# Patient Record
Sex: MALE | Race: WHITE | NOT HISPANIC OR LATINO
[De-identification: names, ages, dates, MRNs, and addresses within clinical notes are randomized per-mention and may not be internally consistent; named-entity substitution may affect disease eponyms.]

---

## 2017-01-12 LAB — PSA SERPL-MCNC: 0.7

## 2017-01-30 ENCOUNTER — APPOINTMENT (OUTPATIENT)
Dept: UROLOGY | Facility: CLINIC | Age: 72
End: 2017-01-30

## 2017-04-24 LAB — PSA SERPL-MCNC: NORMAL

## 2017-05-05 ENCOUNTER — APPOINTMENT (OUTPATIENT)
Dept: UROLOGY | Facility: CLINIC | Age: 72
End: 2017-05-05

## 2017-05-26 ENCOUNTER — APPOINTMENT (OUTPATIENT)
Dept: UROLOGY | Facility: CLINIC | Age: 72
End: 2017-05-26

## 2017-05-29 LAB
BILIRUB UR QL STRIP: NEGATIVE
GLUCOSE UR-MCNC: NEGATIVE
HCG UR QL: 0.2 EU/DL
HGB UR QL STRIP.AUTO: NORMAL
KETONES UR-MCNC: NEGATIVE
LEUKOCYTE ESTERASE UR QL STRIP: NEGATIVE
NITRITE UR QL STRIP: NEGATIVE
PH UR STRIP: 5
PROT UR STRIP-MCNC: NEGATIVE
SP GR UR STRIP: 1.02

## 2017-09-29 ENCOUNTER — RX RENEWAL (OUTPATIENT)
Age: 72
End: 2017-09-29

## 2017-09-29 ENCOUNTER — MEDICATION RENEWAL (OUTPATIENT)
Age: 72
End: 2017-09-29

## 2018-04-04 ENCOUNTER — OUTPATIENT (OUTPATIENT)
Dept: OUTPATIENT SERVICES | Facility: HOSPITAL | Age: 73
LOS: 1 days | Discharge: HOME | End: 2018-04-04

## 2018-04-04 ENCOUNTER — TRANSCRIPTION ENCOUNTER (OUTPATIENT)
Age: 73
End: 2018-04-04

## 2018-04-04 DIAGNOSIS — Z98.89 OTHER SPECIFIED POSTPROCEDURAL STATES: Chronic | ICD-10-CM

## 2018-04-04 DIAGNOSIS — K40.90 UNILATERAL INGUINAL HERNIA, WITHOUT OBSTRUCTION OR GANGRENE, NOT SPECIFIED AS RECURRENT: Chronic | ICD-10-CM

## 2018-04-04 DIAGNOSIS — H33.20 SEROUS RETINAL DETACHMENT, UNSPECIFIED EYE: Chronic | ICD-10-CM

## 2018-04-04 DIAGNOSIS — Z98.890 OTHER SPECIFIED POSTPROCEDURAL STATES: Chronic | ICD-10-CM

## 2018-04-04 DIAGNOSIS — K43.0 INCISIONAL HERNIA WITH OBSTRUCTION, WITHOUT GANGRENE: ICD-10-CM

## 2018-04-04 DIAGNOSIS — Z90.89 ACQUIRED ABSENCE OF OTHER ORGANS: Chronic | ICD-10-CM

## 2018-04-04 LAB
ANION GAP SERPL CALC-SCNC: 4 MMOL/L — LOW (ref 5–17)
BASOPHILS # BLD AUTO: 0.04 K/UL — SIGNIFICANT CHANGE UP (ref 0–0.2)
BASOPHILS NFR BLD AUTO: 0.6 % — SIGNIFICANT CHANGE UP (ref 0–2)
BUN SERPL-MCNC: 18 MG/DL — SIGNIFICANT CHANGE UP (ref 7–23)
CALCIUM SERPL-MCNC: 9.4 MG/DL — SIGNIFICANT CHANGE UP (ref 8.5–10.1)
CHLORIDE SERPL-SCNC: 105 MMOL/L — SIGNIFICANT CHANGE UP (ref 96–108)
CO2 SERPL-SCNC: 30 MMOL/L — SIGNIFICANT CHANGE UP (ref 22–31)
CREAT SERPL-MCNC: 0.97 MG/DL — SIGNIFICANT CHANGE UP (ref 0.5–1.3)
EOSINOPHIL # BLD AUTO: 0.15 K/UL — SIGNIFICANT CHANGE UP (ref 0–0.5)
EOSINOPHIL NFR BLD AUTO: 2.1 % — SIGNIFICANT CHANGE UP (ref 0–6)
GLUCOSE SERPL-MCNC: 90 MG/DL — SIGNIFICANT CHANGE UP (ref 70–99)
HCT VFR BLD CALC: 46.6 % — SIGNIFICANT CHANGE UP (ref 39–50)
HGB BLD-MCNC: 15.6 G/DL — SIGNIFICANT CHANGE UP (ref 13–17)
IMM GRANULOCYTES NFR BLD AUTO: 0.4 % — SIGNIFICANT CHANGE UP (ref 0–1.5)
LYMPHOCYTES # BLD AUTO: 1.86 K/UL — SIGNIFICANT CHANGE UP (ref 1–3.3)
LYMPHOCYTES # BLD AUTO: 26.2 % — SIGNIFICANT CHANGE UP (ref 13–44)
MCHC RBC-ENTMCNC: 29.3 PG — SIGNIFICANT CHANGE UP (ref 27–34)
MCHC RBC-ENTMCNC: 33.5 GM/DL — SIGNIFICANT CHANGE UP (ref 32–36)
MCV RBC AUTO: 87.4 FL — SIGNIFICANT CHANGE UP (ref 80–100)
MONOCYTES # BLD AUTO: 0.77 K/UL — SIGNIFICANT CHANGE UP (ref 0–0.9)
MONOCYTES NFR BLD AUTO: 10.9 % — SIGNIFICANT CHANGE UP (ref 2–14)
NEUTROPHILS # BLD AUTO: 4.24 K/UL — SIGNIFICANT CHANGE UP (ref 1.8–7.4)
NEUTROPHILS NFR BLD AUTO: 59.8 % — SIGNIFICANT CHANGE UP (ref 43–77)
PLATELET # BLD AUTO: 227 K/UL — SIGNIFICANT CHANGE UP (ref 150–400)
POTASSIUM SERPL-MCNC: 4.5 MMOL/L — SIGNIFICANT CHANGE UP (ref 3.5–5.3)
POTASSIUM SERPL-SCNC: 4.5 MMOL/L — SIGNIFICANT CHANGE UP (ref 3.5–5.3)
RBC # BLD: 5.33 M/UL — SIGNIFICANT CHANGE UP (ref 4.2–5.8)
RBC # FLD: 12.8 % — SIGNIFICANT CHANGE UP (ref 10.3–14.5)
SODIUM SERPL-SCNC: 139 MMOL/L — SIGNIFICANT CHANGE UP (ref 135–145)
WBC # BLD: 7.09 K/UL — SIGNIFICANT CHANGE UP (ref 3.8–10.5)
WBC # FLD AUTO: 7.09 K/UL — SIGNIFICANT CHANGE UP (ref 3.8–10.5)

## 2018-04-04 NOTE — ASU PATIENT PROFILE, ADULT - PSH
Detached retina  Repair of retina, extraction of cataract with lens implant  2006  H/O cystoscopy  multiple with bladder biopsy  History of bladder surgery  resection of bladder tumor 2006  History of right inguinal hernia repair  1970s  Right inguinal hernia  40 years ago  S/P colonoscopy  multiple  S/P tonsillectomy  as a child

## 2018-04-04 NOTE — CHART NOTE - NSCHARTNOTEFT_GEN_A_CORE
BP left arm sitting 169/92  repeat 20 minutes later manual right arm sitting 138/94  HR 73 bpm  Dora 97.7 F  RR 14/min  O2 sat 98% on RA      1. Labs as per surgeon  2. EKG on chart  3. Medical clearance with PCP Dr Roca  4. discussed EZ sponges, mupirocin & day of procedure instructions

## 2018-04-04 NOTE — ASU PATIENT PROFILE, ADULT - PMH
Abdominal pain    Bladder cancer  h/o  BPH (benign prostatic hypertrophy)    Hyperlipidemia    Hypertension    Hypothyroid    Right retinal detachment    Ventral hernia

## 2018-04-06 RX ORDER — ACETAMINOPHEN 500 MG
975 TABLET ORAL ONCE
Qty: 0 | Refills: 0 | Status: COMPLETED | OUTPATIENT
Start: 2018-04-09 | End: 2018-04-09

## 2018-04-06 RX ORDER — FAMOTIDINE 10 MG/ML
20 INJECTION INTRAVENOUS ONCE
Qty: 0 | Refills: 0 | Status: COMPLETED | OUTPATIENT
Start: 2018-04-09 | End: 2018-04-09

## 2018-04-09 ENCOUNTER — OUTPATIENT (OUTPATIENT)
Dept: OUTPATIENT SERVICES | Facility: HOSPITAL | Age: 73
LOS: 1 days | Discharge: HOME | End: 2018-04-09

## 2018-04-09 VITALS
HEIGHT: 70 IN | OXYGEN SATURATION: 100 % | HEART RATE: 72 BPM | TEMPERATURE: 97 F | RESPIRATION RATE: 14 BRPM | SYSTOLIC BLOOD PRESSURE: 137 MMHG | DIASTOLIC BLOOD PRESSURE: 81 MMHG | WEIGHT: 212.08 LBS

## 2018-04-09 VITALS
TEMPERATURE: 97 F | RESPIRATION RATE: 16 BRPM | OXYGEN SATURATION: 98 % | DIASTOLIC BLOOD PRESSURE: 80 MMHG | HEART RATE: 66 BPM | SYSTOLIC BLOOD PRESSURE: 144 MMHG

## 2018-04-09 DIAGNOSIS — Z98.890 OTHER SPECIFIED POSTPROCEDURAL STATES: Chronic | ICD-10-CM

## 2018-04-09 DIAGNOSIS — H33.20 SEROUS RETINAL DETACHMENT, UNSPECIFIED EYE: Chronic | ICD-10-CM

## 2018-04-09 DIAGNOSIS — Z90.89 ACQUIRED ABSENCE OF OTHER ORGANS: Chronic | ICD-10-CM

## 2018-04-09 DIAGNOSIS — Z98.89 OTHER SPECIFIED POSTPROCEDURAL STATES: Chronic | ICD-10-CM

## 2018-04-09 DIAGNOSIS — K40.90 UNILATERAL INGUINAL HERNIA, WITHOUT OBSTRUCTION OR GANGRENE, NOT SPECIFIED AS RECURRENT: Chronic | ICD-10-CM

## 2018-04-09 RX ORDER — ASPIRIN/CALCIUM CARB/MAGNESIUM 324 MG
81 TABLET ORAL DAILY
Qty: 0 | Refills: 0 | Status: DISCONTINUED | OUTPATIENT
Start: 2018-04-09 | End: 2018-04-24

## 2018-04-09 RX ORDER — SODIUM CHLORIDE 9 MG/ML
3 INJECTION INTRAMUSCULAR; INTRAVENOUS; SUBCUTANEOUS EVERY 8 HOURS
Qty: 0 | Refills: 0 | Status: DISCONTINUED | OUTPATIENT
Start: 2018-04-09 | End: 2018-04-09

## 2018-04-09 RX ORDER — SODIUM CHLORIDE 9 MG/ML
1000 INJECTION, SOLUTION INTRAVENOUS
Qty: 0 | Refills: 0 | Status: DISCONTINUED | OUTPATIENT
Start: 2018-04-09 | End: 2018-04-24

## 2018-04-09 RX ORDER — ONDANSETRON 8 MG/1
4 TABLET, FILM COATED ORAL ONCE
Qty: 0 | Refills: 0 | Status: DISCONTINUED | OUTPATIENT
Start: 2018-04-09 | End: 2018-04-09

## 2018-04-09 RX ORDER — OXYCODONE HYDROCHLORIDE 5 MG/1
1 TABLET ORAL
Qty: 20 | Refills: 0
Start: 2018-04-09

## 2018-04-09 RX ORDER — LISINOPRIL 2.5 MG/1
20 TABLET ORAL EVERY 12 HOURS
Qty: 0 | Refills: 0 | Status: DISCONTINUED | OUTPATIENT
Start: 2018-04-09 | End: 2018-04-24

## 2018-04-09 RX ORDER — OXYCODONE HYDROCHLORIDE 5 MG/1
5 TABLET ORAL EVERY 4 HOURS
Qty: 0 | Refills: 0 | Status: DISCONTINUED | OUTPATIENT
Start: 2018-04-09 | End: 2018-04-09

## 2018-04-09 RX ORDER — ATORVASTATIN CALCIUM 80 MG/1
20 TABLET, FILM COATED ORAL AT BEDTIME
Qty: 0 | Refills: 0 | Status: DISCONTINUED | OUTPATIENT
Start: 2018-04-09 | End: 2018-04-24

## 2018-04-09 RX ORDER — LEVOTHYROXINE SODIUM 125 MCG
200 TABLET ORAL DAILY
Qty: 0 | Refills: 0 | Status: DISCONTINUED | OUTPATIENT
Start: 2018-04-09 | End: 2018-04-24

## 2018-04-09 RX ORDER — AMLODIPINE BESYLATE 2.5 MG/1
5 TABLET ORAL DAILY
Qty: 0 | Refills: 0 | Status: DISCONTINUED | OUTPATIENT
Start: 2018-04-09 | End: 2018-04-24

## 2018-04-09 RX ORDER — SODIUM CHLORIDE 9 MG/ML
1000 INJECTION, SOLUTION INTRAVENOUS
Qty: 0 | Refills: 0 | Status: DISCONTINUED | OUTPATIENT
Start: 2018-04-09 | End: 2018-04-09

## 2018-04-09 RX ORDER — ONDANSETRON 8 MG/1
4 TABLET, FILM COATED ORAL EVERY 6 HOURS
Qty: 0 | Refills: 0 | Status: DISCONTINUED | OUTPATIENT
Start: 2018-04-09 | End: 2018-04-24

## 2018-04-09 RX ORDER — FENTANYL CITRATE 50 UG/ML
50 INJECTION INTRAVENOUS
Qty: 0 | Refills: 0 | Status: DISCONTINUED | OUTPATIENT
Start: 2018-04-09 | End: 2018-04-09

## 2018-04-09 RX ORDER — FINASTERIDE 5 MG/1
5 TABLET, FILM COATED ORAL DAILY
Qty: 0 | Refills: 0 | Status: DISCONTINUED | OUTPATIENT
Start: 2018-04-09 | End: 2018-04-24

## 2018-04-09 RX ADMIN — Medication 975 MILLIGRAM(S): at 11:57

## 2018-04-09 RX ADMIN — SODIUM CHLORIDE 100 MILLILITER(S): 9 INJECTION, SOLUTION INTRAVENOUS at 13:41

## 2018-04-09 RX ADMIN — FAMOTIDINE 20 MILLIGRAM(S): 10 INJECTION INTRAVENOUS at 11:57

## 2018-04-09 NOTE — ASU DISCHARGE PLAN (ADULT/PEDIATRIC). - NURSING INSTRUCTIONS
Begin with liquids and light food ( tea, toast, Jello, soups). Advance to what you normally eat. Liquids should taken in adequate amounts today.Refer to multi color post op discharge instruction sheet     CALL the DOCTOR:    -Fever greater than  101F  - Signs  of infection such as : increase pain,swelling,redness,or a bad  smell coming from the wound.  -Excessive amount of bleeding.  - Any pain that appears to be getting worse.  - Vomiting  -  If you have  not urinated 8 hours after surgery or have any difficulty urinating.     A responsible adult should be with you for the rest of the day and night for your safety and to help you if you needed.    Review attached FACT SHEET if applicable. For any problems or concerns,contact your doctor. [facility] patients should call the [facility]. If you cannot reach the doctor or clinic, call [facility] Emergency Department at [phone] or go to your local Emergency Department.  A responsible adult should be with you for the rest of the day and night for your safety and to help you if you needed. Resume your medications as listed on the attached Medication Record.

## 2018-04-09 NOTE — BRIEF OPERATIVE NOTE - PROCEDURE
<<-----Click on this checkbox to enter Procedure Umbilical hernia repair  04/09/2018  open  Active  BSLOMA

## 2018-04-11 DIAGNOSIS — E78.00 PURE HYPERCHOLESTEROLEMIA, UNSPECIFIED: ICD-10-CM

## 2018-04-11 DIAGNOSIS — K42.9 UMBILICAL HERNIA WITHOUT OBSTRUCTION OR GANGRENE: ICD-10-CM

## 2018-04-11 DIAGNOSIS — E03.9 HYPOTHYROIDISM, UNSPECIFIED: ICD-10-CM

## 2018-04-11 DIAGNOSIS — K43.9 VENTRAL HERNIA WITHOUT OBSTRUCTION OR GANGRENE: ICD-10-CM

## 2018-09-14 ENCOUNTER — MEDICATION RENEWAL (OUTPATIENT)
Age: 73
End: 2018-09-14

## 2018-10-09 PROBLEM — K43.9 VENTRAL HERNIA WITHOUT OBSTRUCTION OR GANGRENE: Chronic | Status: ACTIVE | Noted: 2018-04-04

## 2018-11-05 ENCOUNTER — APPOINTMENT (OUTPATIENT)
Dept: UROLOGY | Facility: CLINIC | Age: 73
End: 2018-11-05

## 2018-11-14 ENCOUNTER — APPOINTMENT (OUTPATIENT)
Dept: UROLOGY | Facility: CLINIC | Age: 73
End: 2018-11-14
Payer: MEDICARE

## 2018-11-14 ENCOUNTER — APPOINTMENT (OUTPATIENT)
Dept: UROLOGY | Facility: CLINIC | Age: 73
End: 2018-11-14

## 2018-11-14 VITALS
DIASTOLIC BLOOD PRESSURE: 85 MMHG | SYSTOLIC BLOOD PRESSURE: 130 MMHG | WEIGHT: 205 LBS | BODY MASS INDEX: 27.8 KG/M2 | HEART RATE: 100 BPM

## 2018-11-14 PROCEDURE — 99213 OFFICE O/P EST LOW 20 MIN: CPT | Mod: 25

## 2018-11-14 PROCEDURE — 52000 CYSTOURETHROSCOPY: CPT

## 2019-06-04 ENCOUNTER — RX RENEWAL (OUTPATIENT)
Age: 74
End: 2019-06-04

## 2019-06-05 ENCOUNTER — APPOINTMENT (OUTPATIENT)
Dept: OTOLARYNGOLOGY | Facility: CLINIC | Age: 74
End: 2019-06-05
Payer: MEDICARE

## 2019-06-05 VITALS
WEIGHT: 211 LBS | BODY MASS INDEX: 28.58 KG/M2 | HEIGHT: 72 IN | SYSTOLIC BLOOD PRESSURE: 147 MMHG | DIASTOLIC BLOOD PRESSURE: 80 MMHG | HEART RATE: 64 BPM

## 2019-06-05 DIAGNOSIS — Z86.39 PERSONAL HISTORY OF OTHER ENDOCRINE, NUTRITIONAL AND METABOLIC DISEASE: ICD-10-CM

## 2019-06-05 DIAGNOSIS — Z86.79 PERSONAL HISTORY OF OTHER DISEASES OF THE CIRCULATORY SYSTEM: ICD-10-CM

## 2019-06-05 DIAGNOSIS — R09.82 POSTNASAL DRIP: ICD-10-CM

## 2019-06-05 DIAGNOSIS — J06.9 ACUTE UPPER RESPIRATORY INFECTION, UNSPECIFIED: ICD-10-CM

## 2019-06-05 DIAGNOSIS — Z83.2 FAMILY HISTORY OF DISEASES OF THE BLOOD AND BLOOD-FORMING ORGANS AND CERTAIN DISORDERS INVOLVING THE IMMUNE MECHANISM: ICD-10-CM

## 2019-06-05 DIAGNOSIS — Z80.9 FAMILY HISTORY OF MALIGNANT NEOPLASM, UNSPECIFIED: ICD-10-CM

## 2019-06-05 PROCEDURE — 30903 CONTROL OF NOSEBLEED: CPT | Mod: LT

## 2019-06-05 PROCEDURE — 99213 OFFICE O/P EST LOW 20 MIN: CPT | Mod: 25

## 2019-06-05 RX ORDER — ATORVASTATIN CALCIUM 20 MG/1
20 TABLET, FILM COATED ORAL
Qty: 90 | Refills: 0 | Status: ACTIVE | COMMUNITY
Start: 2019-03-01

## 2019-06-05 NOTE — PROCEDURE
[FreeTextEntry3] : The left nasal passage was cleared. A bleeding site is noted along the\par anterior/mid septum\par \par An active bleeding vessel was located.\par Topical Xylocaine and Marvin-Synephrine was then applied on a cotton ball.\par Cauterization was then performed with silver nitrate. Pressure was applied with a cotton ball and it  was left in place for 10 minutes.\par There was cessation of bleeding.\par  [FreeTextEntry2] : left epistaxis

## 2019-06-05 NOTE — HISTORY OF PRESENT ILLNESS
[de-identified] : recurring left nose bleeds past 3 days\par uri chest cold 10 d ago rx by primary cefdinir and prednisone\par sp nasal septoplasty 14 y ago\par daily asa 81

## 2019-06-05 NOTE — REASON FOR VISIT
[Subsequent Evaluation] : a subsequent evaluation for [Epistaxis] : epistaxis [FreeTextEntry2] : chest congestion

## 2019-06-05 NOTE — REVIEW OF SYSTEMS
[Nose Bleeds] : nose bleeds [Snoring With Pauses] : snoring with pauses [Problem Snoring] : problem snoring [Throat Pain] : throat pain [Shortness Of Breath] : shortness of breath [Cough] : cough [Wheezing] : wheezing [Negative] : Heme/Lymph

## 2019-07-05 ENCOUNTER — RX RENEWAL (OUTPATIENT)
Age: 74
End: 2019-07-05

## 2019-08-20 NOTE — ASU PREOP CHECKLIST - NS PREOP CHK CHLOROHEX WASH
at home:
Alert-The patient is alert, awake and responds to voice. The patient is oriented to time, place, and person. The triage nurse is able to obtain subjective information.

## 2019-11-21 ENCOUNTER — RX RENEWAL (OUTPATIENT)
Age: 74
End: 2019-11-21

## 2019-12-10 ENCOUNTER — APPOINTMENT (OUTPATIENT)
Dept: UROLOGY | Facility: CLINIC | Age: 74
End: 2019-12-10
Payer: MEDICARE

## 2019-12-10 VITALS
HEIGHT: 72 IN | WEIGHT: 210 LBS | OXYGEN SATURATION: 97 % | DIASTOLIC BLOOD PRESSURE: 83 MMHG | BODY MASS INDEX: 28.44 KG/M2 | SYSTOLIC BLOOD PRESSURE: 147 MMHG | TEMPERATURE: 98 F | HEART RATE: 74 BPM

## 2019-12-10 DIAGNOSIS — R35.0 FREQUENCY OF MICTURITION: ICD-10-CM

## 2019-12-10 PROCEDURE — 99213 OFFICE O/P EST LOW 20 MIN: CPT | Mod: 25

## 2019-12-10 PROCEDURE — 52000 CYSTOURETHROSCOPY: CPT

## 2019-12-10 RX ORDER — TERAZOSIN 5 MG/1
5 CAPSULE ORAL
Qty: 7 | Refills: 0 | Status: COMPLETED | COMMUNITY
Start: 2017-05-05 | End: 2019-12-10

## 2019-12-10 RX ORDER — CIPROFLOXACIN HYDROCHLORIDE 500 MG/1
500 TABLET, FILM COATED ORAL
Qty: 28 | Refills: 0 | Status: COMPLETED | COMMUNITY
Start: 2017-05-05 | End: 2019-12-10

## 2019-12-10 RX ORDER — CIPROFLOXACIN AND DEXAMETHASONE 3; 1 MG/ML; MG/ML
0.3-0.1 SUSPENSION/ DROPS AURICULAR (OTIC) TWICE DAILY
Qty: 1 | Refills: 1 | Status: COMPLETED | COMMUNITY
Start: 2019-06-04 | End: 2019-12-10

## 2019-12-10 RX ORDER — TAMSULOSIN HYDROCHLORIDE 0.4 MG/1
0.4 CAPSULE ORAL
Qty: 7 | Refills: 0 | Status: COMPLETED | OUTPATIENT
Start: 2017-05-05 | End: 2019-12-10

## 2019-12-10 NOTE — PHYSICAL EXAM
[General Appearance - Well Nourished] : well nourished [General Appearance - Well Developed] : well developed [Normal Appearance] : normal appearance [Well Groomed] : well groomed [General Appearance - In No Acute Distress] : no acute distress [Abdomen Soft] : soft [Abdomen Tenderness] : non-tender [Costovertebral Angle Tenderness] : no ~M costovertebral angle tenderness [Urinary Bladder Findings] : the bladder was normal on palpation [Testes Mass (___cm)] : there were no testicular masses [Scrotum] : the scrotum was normal [Urethral Meatus] : meatus normal [No Prostate Nodules] : no prostate nodules [Edema] : no peripheral edema [] : no respiratory distress [Exaggerated Use Of Accessory Muscles For Inspiration] : no accessory muscle use [Respiration, Rhythm And Depth] : normal respiratory rhythm and effort [Mood] : the mood was normal [Affect] : the affect was normal [Oriented To Time, Place, And Person] : oriented to person, place, and time [Not Anxious] : not anxious [Normal Station and Gait] : the gait and station were normal for the patient's age [No Focal Deficits] : no focal deficits [No Palpable Adenopathy] : no palpable adenopathy

## 2019-12-10 NOTE — ASSESSMENT
[FreeTextEntry1] : Patient stable from a urinary standpoint. Cystoscopy was negative. Labs are ordered and he will follow up in one year

## 2019-12-10 NOTE — HISTORY OF PRESENT ILLNESS
[FreeTextEntry1] : This patient presents for his followup. He has been urinating without difficulty. He also need cystoscopy for his history of bladder cancer in the past.

## 2020-01-27 ENCOUNTER — OUTPATIENT (OUTPATIENT)
Dept: OUTPATIENT SERVICES | Facility: HOSPITAL | Age: 75
LOS: 1 days | End: 2020-01-27
Payer: MEDICARE

## 2020-01-27 DIAGNOSIS — Z01.818 ENCOUNTER FOR OTHER PREPROCEDURAL EXAMINATION: ICD-10-CM

## 2020-01-27 DIAGNOSIS — Z98.890 OTHER SPECIFIED POSTPROCEDURAL STATES: Chronic | ICD-10-CM

## 2020-01-27 DIAGNOSIS — K40.90 UNILATERAL INGUINAL HERNIA, WITHOUT OBSTRUCTION OR GANGRENE, NOT SPECIFIED AS RECURRENT: Chronic | ICD-10-CM

## 2020-01-27 DIAGNOSIS — K40.90 UNILATERAL INGUINAL HERNIA, WITHOUT OBSTRUCTION OR GANGRENE, NOT SPECIFIED AS RECURRENT: ICD-10-CM

## 2020-01-27 DIAGNOSIS — H33.20 SEROUS RETINAL DETACHMENT, UNSPECIFIED EYE: Chronic | ICD-10-CM

## 2020-01-27 DIAGNOSIS — Z90.89 ACQUIRED ABSENCE OF OTHER ORGANS: Chronic | ICD-10-CM

## 2020-01-27 DIAGNOSIS — Z98.49 CATARACT EXTRACTION STATUS, UNSPECIFIED EYE: Chronic | ICD-10-CM

## 2020-01-27 DIAGNOSIS — Z98.89 OTHER SPECIFIED POSTPROCEDURAL STATES: Chronic | ICD-10-CM

## 2020-01-27 LAB
ANION GAP SERPL CALC-SCNC: 2 MMOL/L — LOW (ref 5–17)
BASOPHILS # BLD AUTO: 0.05 K/UL — SIGNIFICANT CHANGE UP (ref 0–0.2)
BASOPHILS NFR BLD AUTO: 0.9 % — SIGNIFICANT CHANGE UP (ref 0–2)
BUN SERPL-MCNC: 17 MG/DL — SIGNIFICANT CHANGE UP (ref 7–23)
CALCIUM SERPL-MCNC: 9.2 MG/DL — SIGNIFICANT CHANGE UP (ref 8.5–10.1)
CHLORIDE SERPL-SCNC: 107 MMOL/L — SIGNIFICANT CHANGE UP (ref 96–108)
CO2 SERPL-SCNC: 29 MMOL/L — SIGNIFICANT CHANGE UP (ref 22–31)
CREAT SERPL-MCNC: 0.93 MG/DL — SIGNIFICANT CHANGE UP (ref 0.5–1.3)
EOSINOPHIL # BLD AUTO: 0.14 K/UL — SIGNIFICANT CHANGE UP (ref 0–0.5)
EOSINOPHIL NFR BLD AUTO: 2.4 % — SIGNIFICANT CHANGE UP (ref 0–6)
GLUCOSE SERPL-MCNC: 105 MG/DL — HIGH (ref 70–99)
HCT VFR BLD CALC: 46.1 % — SIGNIFICANT CHANGE UP (ref 39–50)
HGB BLD-MCNC: 15.1 G/DL — SIGNIFICANT CHANGE UP (ref 13–17)
IMM GRANULOCYTES NFR BLD AUTO: 0.5 % — SIGNIFICANT CHANGE UP (ref 0–1.5)
LYMPHOCYTES # BLD AUTO: 1.53 K/UL — SIGNIFICANT CHANGE UP (ref 1–3.3)
LYMPHOCYTES # BLD AUTO: 26.4 % — SIGNIFICANT CHANGE UP (ref 13–44)
MCHC RBC-ENTMCNC: 29 PG — SIGNIFICANT CHANGE UP (ref 27–34)
MCHC RBC-ENTMCNC: 32.8 GM/DL — SIGNIFICANT CHANGE UP (ref 32–36)
MCV RBC AUTO: 88.5 FL — SIGNIFICANT CHANGE UP (ref 80–100)
MONOCYTES # BLD AUTO: 0.6 K/UL — SIGNIFICANT CHANGE UP (ref 0–0.9)
MONOCYTES NFR BLD AUTO: 10.3 % — SIGNIFICANT CHANGE UP (ref 2–14)
NEUTROPHILS # BLD AUTO: 3.45 K/UL — SIGNIFICANT CHANGE UP (ref 1.8–7.4)
NEUTROPHILS NFR BLD AUTO: 59.5 % — SIGNIFICANT CHANGE UP (ref 43–77)
PLATELET # BLD AUTO: 224 K/UL — SIGNIFICANT CHANGE UP (ref 150–400)
POTASSIUM SERPL-MCNC: 4.6 MMOL/L — SIGNIFICANT CHANGE UP (ref 3.5–5.3)
POTASSIUM SERPL-SCNC: 4.6 MMOL/L — SIGNIFICANT CHANGE UP (ref 3.5–5.3)
RBC # BLD: 5.21 M/UL — SIGNIFICANT CHANGE UP (ref 4.2–5.8)
RBC # FLD: 13.2 % — SIGNIFICANT CHANGE UP (ref 10.3–14.5)
SODIUM SERPL-SCNC: 138 MMOL/L — SIGNIFICANT CHANGE UP (ref 135–145)
WBC # BLD: 5.8 K/UL — SIGNIFICANT CHANGE UP (ref 3.8–10.5)
WBC # FLD AUTO: 5.8 K/UL — SIGNIFICANT CHANGE UP (ref 3.8–10.5)

## 2020-01-27 PROCEDURE — 36415 COLL VENOUS BLD VENIPUNCTURE: CPT

## 2020-01-27 PROCEDURE — 80048 BASIC METABOLIC PNL TOTAL CA: CPT

## 2020-01-27 PROCEDURE — 86901 BLOOD TYPING SEROLOGIC RH(D): CPT

## 2020-01-27 PROCEDURE — 86900 BLOOD TYPING SEROLOGIC ABO: CPT

## 2020-01-27 PROCEDURE — 85025 COMPLETE CBC W/AUTO DIFF WBC: CPT

## 2020-01-27 PROCEDURE — 86850 RBC ANTIBODY SCREEN: CPT

## 2020-01-27 NOTE — ASU PATIENT PROFILE, ADULT - PMH
Abdominal pain    Bladder cancer  h/o  BPH (benign prostatic hypertrophy)    Hyperlipidemia    Hypertension    Hypothyroid    Inguinal hernia    Right retinal detachment    Ventral hernia

## 2020-01-27 NOTE — ASU PATIENT PROFILE, ADULT - PSH
Detached retina  Repair of retina, extraction of cataract with lens implant  2006  H/O cystoscopy  multiple with bladder biopsy  History of bladder surgery  resection of bladder tumor 2006  History of cataract surgery  bilateral  History of right inguinal hernia repair  1970s  Right inguinal hernia  40 years ago  S/P colonoscopy  multiple  S/P tonsillectomy  as a child

## 2020-01-27 NOTE — CHART NOTE - NSCHARTNOTEFT_GEN_A_CORE
74 year old male presents to PST for left inguinal hernia repair    Plan:  1. PST instructions given ; NPO status instructions to be given by ASU   2. Pt instructed to take following meds with sip of water : fosinopril   3. EZ wash instructions given & mupirocin instructions given  4. Medical Optimization  with Dr Jesus Roca   5. Stop NSAIDS ( Aspirin Alev Motrin Mobic Diclofenac), herbal supplements , MVI , Vitamin fish oil 7 days prior to surgery  unless directed by surgeon or cardiologist;   6. Labs EKG as per surgeon request

## 2020-01-28 DIAGNOSIS — Z01.818 ENCOUNTER FOR OTHER PREPROCEDURAL EXAMINATION: ICD-10-CM

## 2020-01-28 DIAGNOSIS — K40.90 UNILATERAL INGUINAL HERNIA, WITHOUT OBSTRUCTION OR GANGRENE, NOT SPECIFIED AS RECURRENT: ICD-10-CM

## 2020-01-30 ENCOUNTER — OUTPATIENT (OUTPATIENT)
Dept: INPATIENT UNIT | Facility: HOSPITAL | Age: 75
LOS: 1 days | Discharge: HOME | End: 2020-01-30
Payer: MEDICARE

## 2020-01-30 ENCOUNTER — RESULT REVIEW (OUTPATIENT)
Age: 75
End: 2020-01-30

## 2020-01-30 VITALS
HEIGHT: 74 IN | DIASTOLIC BLOOD PRESSURE: 83 MMHG | HEART RATE: 71 BPM | WEIGHT: 220.46 LBS | TEMPERATURE: 97 F | SYSTOLIC BLOOD PRESSURE: 151 MMHG | OXYGEN SATURATION: 100 % | RESPIRATION RATE: 16 BRPM

## 2020-01-30 VITALS
HEART RATE: 79 BPM | DIASTOLIC BLOOD PRESSURE: 76 MMHG | SYSTOLIC BLOOD PRESSURE: 135 MMHG | OXYGEN SATURATION: 97 % | TEMPERATURE: 98 F | RESPIRATION RATE: 17 BRPM

## 2020-01-30 DIAGNOSIS — K40.90 UNILATERAL INGUINAL HERNIA, WITHOUT OBSTRUCTION OR GANGRENE, NOT SPECIFIED AS RECURRENT: ICD-10-CM

## 2020-01-30 DIAGNOSIS — Z98.890 OTHER SPECIFIED POSTPROCEDURAL STATES: Chronic | ICD-10-CM

## 2020-01-30 DIAGNOSIS — K40.90 UNILATERAL INGUINAL HERNIA, WITHOUT OBSTRUCTION OR GANGRENE, NOT SPECIFIED AS RECURRENT: Chronic | ICD-10-CM

## 2020-01-30 DIAGNOSIS — H33.20 SEROUS RETINAL DETACHMENT, UNSPECIFIED EYE: Chronic | ICD-10-CM

## 2020-01-30 DIAGNOSIS — Z98.89 OTHER SPECIFIED POSTPROCEDURAL STATES: Chronic | ICD-10-CM

## 2020-01-30 DIAGNOSIS — Z90.89 ACQUIRED ABSENCE OF OTHER ORGANS: Chronic | ICD-10-CM

## 2020-01-30 DIAGNOSIS — Z98.49 CATARACT EXTRACTION STATUS, UNSPECIFIED EYE: Chronic | ICD-10-CM

## 2020-01-30 PROCEDURE — 49505 PRP I/HERN INIT REDUC >5 YR: CPT | Mod: AS,RT

## 2020-01-30 PROCEDURE — 88302 TISSUE EXAM BY PATHOLOGIST: CPT | Mod: 26

## 2020-01-30 PROCEDURE — 88302 TISSUE EXAM BY PATHOLOGIST: CPT

## 2020-01-30 RX ORDER — SODIUM CHLORIDE 9 MG/ML
1000 INJECTION, SOLUTION INTRAVENOUS
Refills: 0 | Status: DISCONTINUED | OUTPATIENT
Start: 2020-01-30 | End: 2020-01-30

## 2020-01-30 RX ORDER — FENTANYL CITRATE 50 UG/ML
50 INJECTION INTRAVENOUS
Refills: 0 | Status: DISCONTINUED | OUTPATIENT
Start: 2020-01-30 | End: 2020-01-30

## 2020-01-30 RX ORDER — ONDANSETRON 8 MG/1
4 TABLET, FILM COATED ORAL ONCE
Refills: 0 | Status: DISCONTINUED | OUTPATIENT
Start: 2020-01-30 | End: 2020-01-30

## 2020-01-30 RX ORDER — OXYCODONE HYDROCHLORIDE 5 MG/1
10 TABLET ORAL ONCE
Refills: 0 | Status: DISCONTINUED | OUTPATIENT
Start: 2020-01-30 | End: 2020-01-30

## 2020-01-30 NOTE — ASU DISCHARGE PLAN (ADULT/PEDIATRIC) - CARE PROVIDER_API CALL
Abhishek Durham)  Surgery  [address] [place] [address]  Phone: [phone]  Fax: [phone]  Follow Up Time:

## 2020-01-30 NOTE — ASU DISCHARGE PLAN (ADULT/PEDIATRIC) - CALL YOUR DOCTOR IF YOU HAVE ANY OF THE FOLLOWING:
Inability to tolerate liquids or foods/Increased irritability or sluggishness/Unable to urinate/Fever greater than (need to indicate Fahrenheit or Celsius)/Bleeding that does not stop/Pain not relieved by Medications

## 2020-02-04 DIAGNOSIS — I07.1 RHEUMATIC TRICUSPID INSUFFICIENCY: ICD-10-CM

## 2020-02-04 DIAGNOSIS — E03.9 HYPOTHYROIDISM, UNSPECIFIED: ICD-10-CM

## 2020-02-04 DIAGNOSIS — Z85.51 PERSONAL HISTORY OF MALIGNANT NEOPLASM OF BLADDER: ICD-10-CM

## 2020-02-04 DIAGNOSIS — K40.90 UNILATERAL INGUINAL HERNIA, WITHOUT OBSTRUCTION OR GANGRENE, NOT SPECIFIED AS RECURRENT: ICD-10-CM

## 2020-02-04 DIAGNOSIS — I10 ESSENTIAL (PRIMARY) HYPERTENSION: ICD-10-CM

## 2020-02-04 DIAGNOSIS — N40.0 BENIGN PROSTATIC HYPERPLASIA WITHOUT LOWER URINARY TRACT SYMPTOMS: ICD-10-CM

## 2020-02-04 DIAGNOSIS — E78.5 HYPERLIPIDEMIA, UNSPECIFIED: ICD-10-CM

## 2020-02-04 DIAGNOSIS — R01.1 CARDIAC MURMUR, UNSPECIFIED: ICD-10-CM

## 2020-04-15 LAB — PSA SERPL-MCNC: 1.24 NG/ML

## 2020-04-16 LAB
APPEARANCE: CLEAR
BACTERIA: NEGATIVE
BILIRUBIN URINE: NEGATIVE
BLOOD URINE: ABNORMAL
COLOR: NORMAL
GLUCOSE QUALITATIVE U: NEGATIVE
HYALINE CASTS: 0 /LPF
KETONES URINE: NEGATIVE
LEUKOCYTE ESTERASE URINE: ABNORMAL
MICROSCOPIC-UA: NORMAL
NITRITE URINE: NEGATIVE
PH URINE: 6
PROTEIN URINE: NORMAL
RED BLOOD CELLS URINE: 15 /HPF
SPECIFIC GRAVITY URINE: 1.02
SQUAMOUS EPITHELIAL CELLS: 6 /HPF
URINE CYTOLOGY: NORMAL
UROBILINOGEN URINE: NORMAL
WHITE BLOOD CELLS URINE: 32 /HPF

## 2020-05-04 ENCOUNTER — APPOINTMENT (OUTPATIENT)
Dept: UROLOGY | Facility: CLINIC | Age: 75
End: 2020-05-04
Payer: MEDICARE

## 2020-05-04 VITALS
HEART RATE: 73 BPM | BODY MASS INDEX: 28.71 KG/M2 | HEIGHT: 72 IN | WEIGHT: 212 LBS | SYSTOLIC BLOOD PRESSURE: 152 MMHG | DIASTOLIC BLOOD PRESSURE: 80 MMHG | OXYGEN SATURATION: 98 %

## 2020-05-04 PROCEDURE — 99213 OFFICE O/P EST LOW 20 MIN: CPT | Mod: 25

## 2020-05-04 PROCEDURE — 52000 CYSTOURETHROSCOPY: CPT

## 2020-05-04 NOTE — END OF VISIT
[FreeTextEntry3] : He will take a 10-day course of ciprofloxacin and labs will be repeated at that time.  We will follow-up cystoscopy in 6 months

## 2020-05-04 NOTE — PHYSICAL EXAM
[General Appearance - Well Developed] : well developed [General Appearance - Well Nourished] : well nourished [Normal Appearance] : normal appearance [Well Groomed] : well groomed [General Appearance - In No Acute Distress] : no acute distress [Costovertebral Angle Tenderness] : no ~M costovertebral angle tenderness [Abdomen Tenderness] : non-tender [Abdomen Soft] : soft [Urethral Meatus] : meatus normal [Urinary Bladder Findings] : the bladder was normal on palpation [Scrotum] : the scrotum was normal [Testes Mass (___cm)] : there were no testicular masses [No Prostate Nodules] : no prostate nodules [Edema] : no peripheral edema [] : no respiratory distress [Exaggerated Use Of Accessory Muscles For Inspiration] : no accessory muscle use [Oriented To Time, Place, And Person] : oriented to person, place, and time [Respiration, Rhythm And Depth] : normal respiratory rhythm and effort [Affect] : the affect was normal [Mood] : the mood was normal [Not Anxious] : not anxious [Normal Station and Gait] : the gait and station were normal for the patient's age [No Focal Deficits] : no focal deficits [No Palpable Adenopathy] : no palpable adenopathy

## 2020-05-04 NOTE — HISTORY OF PRESENT ILLNESS
[FreeTextEntry1] : This patient is here for the evaluation of atypical urinary cytology.  He also notes cloudy urine and some mildly irritative symptoms.

## 2020-05-06 DIAGNOSIS — N41.9 INFLAMMATORY DISEASE OF PROSTATE, UNSPECIFIED: ICD-10-CM

## 2020-05-20 ENCOUNTER — LABORATORY RESULT (OUTPATIENT)
Age: 75
End: 2020-05-20

## 2020-05-30 ENCOUNTER — EMERGENCY (EMERGENCY)
Facility: HOSPITAL | Age: 75
LOS: 0 days | Discharge: HOME | End: 2020-05-30
Payer: MEDICARE

## 2020-05-30 VITALS
DIASTOLIC BLOOD PRESSURE: 85 MMHG | RESPIRATION RATE: 16 BRPM | SYSTOLIC BLOOD PRESSURE: 154 MMHG | OXYGEN SATURATION: 98 % | HEART RATE: 75 BPM | TEMPERATURE: 99 F

## 2020-05-30 DIAGNOSIS — Z98.89 OTHER SPECIFIED POSTPROCEDURAL STATES: Chronic | ICD-10-CM

## 2020-05-30 DIAGNOSIS — Z79.82 LONG TERM (CURRENT) USE OF ASPIRIN: ICD-10-CM

## 2020-05-30 DIAGNOSIS — Z98.890 OTHER SPECIFIED POSTPROCEDURAL STATES: Chronic | ICD-10-CM

## 2020-05-30 DIAGNOSIS — Z79.899 OTHER LONG TERM (CURRENT) DRUG THERAPY: ICD-10-CM

## 2020-05-30 DIAGNOSIS — B34.9 VIRAL INFECTION, UNSPECIFIED: ICD-10-CM

## 2020-05-30 DIAGNOSIS — K40.90 UNILATERAL INGUINAL HERNIA, WITHOUT OBSTRUCTION OR GANGRENE, NOT SPECIFIED AS RECURRENT: Chronic | ICD-10-CM

## 2020-05-30 DIAGNOSIS — H33.20 SEROUS RETINAL DETACHMENT, UNSPECIFIED EYE: Chronic | ICD-10-CM

## 2020-05-30 DIAGNOSIS — R06.7 SNEEZING: ICD-10-CM

## 2020-05-30 DIAGNOSIS — Z98.49 CATARACT EXTRACTION STATUS, UNSPECIFIED EYE: Chronic | ICD-10-CM

## 2020-05-30 DIAGNOSIS — Z90.89 ACQUIRED ABSENCE OF OTHER ORGANS: Chronic | ICD-10-CM

## 2020-05-30 LAB — SARS-COV-2 RNA SPEC QL NAA+PROBE: SIGNIFICANT CHANGE UP

## 2020-05-30 PROCEDURE — 99284 EMERGENCY DEPT VISIT MOD MDM: CPT

## 2020-05-30 PROCEDURE — U0003: CPT

## 2020-05-30 PROCEDURE — 99283 EMERGENCY DEPT VISIT LOW MDM: CPT

## 2020-05-30 PROCEDURE — 99282 EMERGENCY DEPT VISIT SF MDM: CPT

## 2020-05-30 NOTE — ED STATDOCS - OBJECTIVE STATEMENT
Pt with no PMH presents to ED with sneezing x 4 days. Pt unsure if recently exposed to COVID-19. Pt here for testing.

## 2020-05-30 NOTE — ED ADULT NURSE NOTE - OBJECTIVE STATEMENT
pt presents to Ed alert and orientedx3, VSs afebrile, pt is asymtpomatic and asking for covid testing

## 2020-05-30 NOTE — ED STATDOCS - PATIENT PORTAL LINK FT
You can access the FollowMyHealth Patient Portal offered by [facility] by registering at the following website: [email or web address]. By joining [employer]’s FollowMyHealth portal, you will also be able to view your health information using other applications (apps) compatible with our system.

## 2020-05-30 NOTE — ED ADULT TRIAGE NOTE - CHIEF COMPLAINT QUOTE
pt presents to ED for covid testing, pt states they are asymptomatic, pt gives verbal consent to receive results via text

## 2020-05-31 ENCOUNTER — TRANSCRIPTION ENCOUNTER (OUTPATIENT)
Age: 75
End: 2020-05-31

## 2020-10-05 ENCOUNTER — RX RENEWAL (OUTPATIENT)
Age: 75
End: 2020-10-05

## 2020-11-04 ENCOUNTER — APPOINTMENT (OUTPATIENT)
Dept: UROLOGY | Facility: CLINIC | Age: 75
End: 2020-11-04
Payer: MEDICARE

## 2020-11-04 VITALS
HEIGHT: 72 IN | BODY MASS INDEX: 28.71 KG/M2 | OXYGEN SATURATION: 99 % | DIASTOLIC BLOOD PRESSURE: 83 MMHG | SYSTOLIC BLOOD PRESSURE: 132 MMHG | WEIGHT: 212 LBS | HEART RATE: 81 BPM | TEMPERATURE: 98.2 F

## 2020-11-04 DIAGNOSIS — N52.9 MALE ERECTILE DYSFUNCTION, UNSPECIFIED: ICD-10-CM

## 2020-11-04 PROCEDURE — 52000 CYSTOURETHROSCOPY: CPT

## 2020-11-05 LAB
ANION GAP SERPL CALC-SCNC: 13 MMOL/L
BUN SERPL-MCNC: 18 MG/DL
CALCIUM SERPL-MCNC: 9.7 MG/DL
CHLORIDE SERPL-SCNC: 101 MMOL/L
CO2 SERPL-SCNC: 26 MMOL/L
CREAT SERPL-MCNC: 1.07 MG/DL
GLUCOSE SERPL-MCNC: 105 MG/DL
POTASSIUM SERPL-SCNC: 4.9 MMOL/L
SODIUM SERPL-SCNC: 140 MMOL/L
TESTOST SERPL-MCNC: 647 NG/DL

## 2020-11-10 ENCOUNTER — NON-APPOINTMENT (OUTPATIENT)
Age: 75
End: 2020-11-10

## 2020-12-21 PROBLEM — J06.9 ACUTE URI: Status: RESOLVED | Noted: 2019-06-05 | Resolved: 2020-12-21

## 2021-03-09 ENCOUNTER — NON-APPOINTMENT (OUTPATIENT)
Age: 76
End: 2021-03-09

## 2021-03-09 DIAGNOSIS — R39.15 URGENCY OF URINATION: ICD-10-CM

## 2021-03-10 LAB
APPEARANCE: CLEAR
BACTERIA UR CULT: NORMAL
BACTERIA: NEGATIVE
BILIRUBIN URINE: NEGATIVE
BLOOD URINE: NORMAL
CALCIUM OXALATE CRYSTALS: ABNORMAL
COLOR: YELLOW
GLUCOSE QUALITATIVE U: NEGATIVE
HYALINE CASTS: 0 /LPF
KETONES URINE: NEGATIVE
LEUKOCYTE ESTERASE URINE: ABNORMAL
MICROSCOPIC-UA: NORMAL
NITRITE URINE: NEGATIVE
PH URINE: 5.5
PROTEIN URINE: ABNORMAL
RED BLOOD CELLS URINE: 10 /HPF
SPECIFIC GRAVITY URINE: 1.03
SQUAMOUS EPITHELIAL CELLS: 2 /HPF
UROBILINOGEN URINE: NORMAL
WHITE BLOOD CELLS URINE: 51 /HPF

## 2021-03-11 ENCOUNTER — NON-APPOINTMENT (OUTPATIENT)
Age: 76
End: 2021-03-11

## 2021-04-30 ENCOUNTER — LABORATORY RESULT (OUTPATIENT)
Age: 76
End: 2021-04-30

## 2021-05-05 ENCOUNTER — APPOINTMENT (OUTPATIENT)
Dept: RADIOLOGY | Facility: CLINIC | Age: 76
End: 2021-05-05
Payer: MEDICARE

## 2021-05-05 ENCOUNTER — APPOINTMENT (OUTPATIENT)
Dept: CT IMAGING | Facility: CLINIC | Age: 76
End: 2021-05-05
Payer: MEDICARE

## 2021-05-05 ENCOUNTER — RESULT REVIEW (OUTPATIENT)
Age: 76
End: 2021-05-05

## 2021-05-05 ENCOUNTER — OUTPATIENT (OUTPATIENT)
Dept: OUTPATIENT SERVICES | Facility: HOSPITAL | Age: 76
LOS: 1 days | End: 2021-05-05
Payer: MEDICARE

## 2021-05-05 ENCOUNTER — APPOINTMENT (OUTPATIENT)
Dept: UROLOGY | Facility: CLINIC | Age: 76
End: 2021-05-05
Payer: MEDICARE

## 2021-05-05 DIAGNOSIS — Z98.49 CATARACT EXTRACTION STATUS, UNSPECIFIED EYE: Chronic | ICD-10-CM

## 2021-05-05 DIAGNOSIS — C65.1 MALIGNANT NEOPLASM OF RIGHT RENAL PELVIS: ICD-10-CM

## 2021-05-05 DIAGNOSIS — Z98.890 OTHER SPECIFIED POSTPROCEDURAL STATES: Chronic | ICD-10-CM

## 2021-05-05 DIAGNOSIS — K40.90 UNILATERAL INGUINAL HERNIA, WITHOUT OBSTRUCTION OR GANGRENE, NOT SPECIFIED AS RECURRENT: Chronic | ICD-10-CM

## 2021-05-05 DIAGNOSIS — Z90.89 ACQUIRED ABSENCE OF OTHER ORGANS: Chronic | ICD-10-CM

## 2021-05-05 DIAGNOSIS — Z98.89 OTHER SPECIFIED POSTPROCEDURAL STATES: Chronic | ICD-10-CM

## 2021-05-05 DIAGNOSIS — H33.20 SEROUS RETINAL DETACHMENT, UNSPECIFIED EYE: Chronic | ICD-10-CM

## 2021-05-05 DIAGNOSIS — R31.29 OTHER MICROSCOPIC HEMATURIA: ICD-10-CM

## 2021-05-05 PROCEDURE — 72070 X-RAY EXAM THORAC SPINE 2VWS: CPT | Mod: 26

## 2021-05-05 PROCEDURE — 72100 X-RAY EXAM L-S SPINE 2/3 VWS: CPT

## 2021-05-05 PROCEDURE — 72100 X-RAY EXAM L-S SPINE 2/3 VWS: CPT | Mod: 26

## 2021-05-05 PROCEDURE — 74178 CT ABD&PLV WO CNTR FLWD CNTR: CPT | Mod: 26,MH

## 2021-05-05 PROCEDURE — 72070 X-RAY EXAM THORAC SPINE 2VWS: CPT

## 2021-05-05 PROCEDURE — 82565 ASSAY OF CREATININE: CPT

## 2021-05-05 PROCEDURE — 99213 OFFICE O/P EST LOW 20 MIN: CPT

## 2021-05-05 PROCEDURE — 74178 CT ABD&PLV WO CNTR FLWD CNTR: CPT

## 2021-05-05 NOTE — HISTORY OF PRESENT ILLNESS
[FreeTextEntry1] : This patient presents for a checkup.  His labs have shown microhematuria but his PSA is 1.5 and the cytology is negative.  Is also noted the onset of intermittent back pain.

## 2021-05-05 NOTE — END OF VISIT
[FreeTextEntry3] : A CAT scan is ordered and cystoscopy will be done.  Lumbosacral and thoracic spine x-rays were ordered as well.  Plans to follow-up

## 2021-05-10 ENCOUNTER — NON-APPOINTMENT (OUTPATIENT)
Age: 76
End: 2021-05-10

## 2021-05-12 ENCOUNTER — APPOINTMENT (OUTPATIENT)
Dept: UROLOGY | Facility: CLINIC | Age: 76
End: 2021-05-12
Payer: MEDICARE

## 2021-05-12 DIAGNOSIS — Z85.51 PERSONAL HISTORY OF MALIGNANT NEOPLASM OF BLADDER: ICD-10-CM

## 2021-05-12 PROCEDURE — 52000 CYSTOURETHROSCOPY: CPT

## 2021-05-12 PROCEDURE — 99213 OFFICE O/P EST LOW 20 MIN: CPT | Mod: 25

## 2021-05-12 NOTE — HISTORY OF PRESENT ILLNESS
[FreeTextEntry1] : This patient is here with his wife.  He had hematuria and a CAT scan showed a probable

## 2021-05-12 NOTE — END OF VISIT
[FreeTextEntry3] : I reviewed this with the patient and his wife and recommended that they see Sachi Torres M.D.

## 2021-05-17 ENCOUNTER — APPOINTMENT (OUTPATIENT)
Dept: UROLOGY | Facility: CLINIC | Age: 76
End: 2021-05-17
Payer: MEDICARE

## 2021-05-17 VITALS — TEMPERATURE: 98.5 F

## 2021-05-17 PROCEDURE — 99214 OFFICE O/P EST MOD 30 MIN: CPT

## 2021-05-17 NOTE — ASSESSMENT
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

## 2021-05-17 NOTE — REASON FOR VISIT
[Initial Visit ___] : [unfilled] is here today for an initial visit  for [unfilled] [Spouse] : spouse [Follow-up Visit ___] : a follow-up visit  for [unfilled]

## 2021-05-17 NOTE — HISTORY OF PRESENT ILLNESS
[None] : no symptoms [FreeTextEntry1] : This patient is here with his wife.  \par He had hematuria and a CAT scan showed a probable recurrence of TCC in the upper tract.

## 2021-05-17 NOTE — PHYSICAL EXAM
[General Appearance - Well Developed] : well developed [General Appearance - Well Nourished] : well nourished [Normal Appearance] : normal appearance [Well Groomed] : well groomed [General Appearance - In No Acute Distress] : no acute distress [Respiration, Rhythm And Depth] : normal respiratory rhythm and effort [Exaggerated Use Of Accessory Muscles For Inspiration] : no accessory muscle use [Normal Station and Gait] : the gait and station were normal for the patient's age [] : no rash [No Focal Deficits] : no focal deficits [Oriented To Time, Place, And Person] : oriented to person, place, and time [Affect] : the affect was normal [Mood] : the mood was normal [Not Anxious] : not anxious [Edema] : no peripheral edema [Abdomen Soft] : soft [Abdomen Tenderness] : non-tender [Costovertebral Angle Tenderness] : no ~M costovertebral angle tenderness [Urethral Meatus] : meatus normal [Urinary Bladder Findings] : the bladder was normal on palpation [Scrotum] : the scrotum was normal [Testes Mass (___cm)] : there were no testicular masses [No Prostate Nodules] : no prostate nodules

## 2021-05-25 ENCOUNTER — OUTPATIENT (OUTPATIENT)
Dept: OUTPATIENT SERVICES | Facility: HOSPITAL | Age: 76
LOS: 1 days | End: 2021-05-25
Payer: MEDICARE

## 2021-05-25 ENCOUNTER — RESULT REVIEW (OUTPATIENT)
Age: 76
End: 2021-05-25

## 2021-05-25 VITALS
OXYGEN SATURATION: 100 % | SYSTOLIC BLOOD PRESSURE: 162 MMHG | HEART RATE: 75 BPM | DIASTOLIC BLOOD PRESSURE: 89 MMHG | TEMPERATURE: 98 F | HEIGHT: 72 IN | RESPIRATION RATE: 18 BRPM | WEIGHT: 207.9 LBS

## 2021-05-25 DIAGNOSIS — Z98.890 OTHER SPECIFIED POSTPROCEDURAL STATES: Chronic | ICD-10-CM

## 2021-05-25 DIAGNOSIS — K40.90 UNILATERAL INGUINAL HERNIA, WITHOUT OBSTRUCTION OR GANGRENE, NOT SPECIFIED AS RECURRENT: Chronic | ICD-10-CM

## 2021-05-25 DIAGNOSIS — Z98.49 CATARACT EXTRACTION STATUS, UNSPECIFIED EYE: Chronic | ICD-10-CM

## 2021-05-25 DIAGNOSIS — Z90.89 ACQUIRED ABSENCE OF OTHER ORGANS: Chronic | ICD-10-CM

## 2021-05-25 DIAGNOSIS — Z98.89 OTHER SPECIFIED POSTPROCEDURAL STATES: Chronic | ICD-10-CM

## 2021-05-25 DIAGNOSIS — C65.1 MALIGNANT NEOPLASM OF RIGHT RENAL PELVIS: ICD-10-CM

## 2021-05-25 DIAGNOSIS — H33.20 SEROUS RETINAL DETACHMENT, UNSPECIFIED EYE: Chronic | ICD-10-CM

## 2021-05-25 DIAGNOSIS — Z01.818 ENCOUNTER FOR OTHER PREPROCEDURAL EXAMINATION: ICD-10-CM

## 2021-05-25 LAB
ANION GAP SERPL CALC-SCNC: 3 MMOL/L — LOW (ref 5–17)
APPEARANCE UR: CLEAR — SIGNIFICANT CHANGE UP
APTT BLD: 30.2 SEC — SIGNIFICANT CHANGE UP (ref 27.5–35.5)
BASOPHILS # BLD AUTO: 0.04 K/UL — SIGNIFICANT CHANGE UP (ref 0–0.2)
BASOPHILS NFR BLD AUTO: 0.6 % — SIGNIFICANT CHANGE UP (ref 0–2)
BILIRUB UR-MCNC: NEGATIVE — SIGNIFICANT CHANGE UP
BUN SERPL-MCNC: 20 MG/DL — SIGNIFICANT CHANGE UP (ref 7–23)
CALCIUM SERPL-MCNC: 9.6 MG/DL — SIGNIFICANT CHANGE UP (ref 8.5–10.1)
CHLORIDE SERPL-SCNC: 111 MMOL/L — HIGH (ref 96–108)
CO2 SERPL-SCNC: 28 MMOL/L — SIGNIFICANT CHANGE UP (ref 22–31)
COLOR SPEC: YELLOW — SIGNIFICANT CHANGE UP
CREAT SERPL-MCNC: 1.04 MG/DL — SIGNIFICANT CHANGE UP (ref 0.5–1.3)
DIFF PNL FLD: ABNORMAL
EOSINOPHIL # BLD AUTO: 0.17 K/UL — SIGNIFICANT CHANGE UP (ref 0–0.5)
EOSINOPHIL NFR BLD AUTO: 2.6 % — SIGNIFICANT CHANGE UP (ref 0–6)
GLUCOSE SERPL-MCNC: 108 MG/DL — HIGH (ref 70–99)
GLUCOSE UR QL: NEGATIVE MG/DL — SIGNIFICANT CHANGE UP
HCT VFR BLD CALC: 48.3 % — SIGNIFICANT CHANGE UP (ref 39–50)
HGB BLD-MCNC: 15.5 G/DL — SIGNIFICANT CHANGE UP (ref 13–17)
IMM GRANULOCYTES NFR BLD AUTO: 0.5 % — SIGNIFICANT CHANGE UP (ref 0–1.5)
INR BLD: 1.02 RATIO — SIGNIFICANT CHANGE UP (ref 0.88–1.16)
KETONES UR-MCNC: NEGATIVE — SIGNIFICANT CHANGE UP
LEUKOCYTE ESTERASE UR-ACNC: ABNORMAL
LYMPHOCYTES # BLD AUTO: 1.72 K/UL — SIGNIFICANT CHANGE UP (ref 1–3.3)
LYMPHOCYTES # BLD AUTO: 26.5 % — SIGNIFICANT CHANGE UP (ref 13–44)
MCHC RBC-ENTMCNC: 28.6 PG — SIGNIFICANT CHANGE UP (ref 27–34)
MCHC RBC-ENTMCNC: 32.1 GM/DL — SIGNIFICANT CHANGE UP (ref 32–36)
MCV RBC AUTO: 89.1 FL — SIGNIFICANT CHANGE UP (ref 80–100)
MONOCYTES # BLD AUTO: 0.71 K/UL — SIGNIFICANT CHANGE UP (ref 0–0.9)
MONOCYTES NFR BLD AUTO: 11 % — SIGNIFICANT CHANGE UP (ref 2–14)
NEUTROPHILS # BLD AUTO: 3.81 K/UL — SIGNIFICANT CHANGE UP (ref 1.8–7.4)
NEUTROPHILS NFR BLD AUTO: 58.8 % — SIGNIFICANT CHANGE UP (ref 43–77)
NITRITE UR-MCNC: NEGATIVE — SIGNIFICANT CHANGE UP
PH UR: 5 — SIGNIFICANT CHANGE UP (ref 5–8)
PLATELET # BLD AUTO: 219 K/UL — SIGNIFICANT CHANGE UP (ref 150–400)
POTASSIUM SERPL-MCNC: 4.3 MMOL/L — SIGNIFICANT CHANGE UP (ref 3.5–5.3)
POTASSIUM SERPL-SCNC: 4.3 MMOL/L — SIGNIFICANT CHANGE UP (ref 3.5–5.3)
PROT UR-MCNC: 30 MG/DL
PROTHROM AB SERPL-ACNC: 11.9 SEC — SIGNIFICANT CHANGE UP (ref 10.6–13.6)
RBC # BLD: 5.42 M/UL — SIGNIFICANT CHANGE UP (ref 4.2–5.8)
RBC # FLD: 13.1 % — SIGNIFICANT CHANGE UP (ref 10.3–14.5)
SODIUM SERPL-SCNC: 142 MMOL/L — SIGNIFICANT CHANGE UP (ref 135–145)
SP GR SPEC: 1.02 — SIGNIFICANT CHANGE UP (ref 1.01–1.02)
UROBILINOGEN FLD QL: NEGATIVE MG/DL — SIGNIFICANT CHANGE UP
WBC # BLD: 6.48 K/UL — SIGNIFICANT CHANGE UP (ref 3.8–10.5)
WBC # FLD AUTO: 6.48 K/UL — SIGNIFICANT CHANGE UP (ref 3.8–10.5)

## 2021-05-25 PROCEDURE — 71046 X-RAY EXAM CHEST 2 VIEWS: CPT

## 2021-05-25 PROCEDURE — 87086 URINE CULTURE/COLONY COUNT: CPT

## 2021-05-25 PROCEDURE — 71046 X-RAY EXAM CHEST 2 VIEWS: CPT | Mod: 26

## 2021-05-25 PROCEDURE — 85025 COMPLETE CBC W/AUTO DIFF WBC: CPT

## 2021-05-25 PROCEDURE — 85610 PROTHROMBIN TIME: CPT

## 2021-05-25 PROCEDURE — 86850 RBC ANTIBODY SCREEN: CPT

## 2021-05-25 PROCEDURE — 85730 THROMBOPLASTIN TIME PARTIAL: CPT

## 2021-05-25 PROCEDURE — 36415 COLL VENOUS BLD VENIPUNCTURE: CPT

## 2021-05-25 PROCEDURE — 86900 BLOOD TYPING SEROLOGIC ABO: CPT

## 2021-05-25 PROCEDURE — 86901 BLOOD TYPING SEROLOGIC RH(D): CPT

## 2021-05-25 PROCEDURE — 93010 ELECTROCARDIOGRAM REPORT: CPT

## 2021-05-25 PROCEDURE — 81001 URINALYSIS AUTO W/SCOPE: CPT

## 2021-05-25 PROCEDURE — 80048 BASIC METABOLIC PNL TOTAL CA: CPT

## 2021-05-25 PROCEDURE — G0463: CPT | Mod: 25

## 2021-05-25 PROCEDURE — 93005 ELECTROCARDIOGRAM TRACING: CPT

## 2021-05-25 RX ORDER — OMEGA-3 ACID ETHYL ESTERS 1 G
0 CAPSULE ORAL
Qty: 0 | Refills: 0 | DISCHARGE

## 2021-05-25 NOTE — H&P PST ADULT - NSICDXFAMILYHX_GEN_ALL_CORE_FT
FAMILY HISTORY:  Father  Still living? No  Family history of heart disease, Age at diagnosis: Age Unknown    Mother  Still living? No  Family history of colon cancer, Age at diagnosis: Age Unknown

## 2021-05-25 NOTE — H&P PST ADULT - NSICDXPASTMEDICALHX_GEN_ALL_CORE_FT
PAST MEDICAL HISTORY:  Bladder cancer h/o    BPH (benign prostatic hypertrophy)     Hematuria     Hyperlipidemia     Hypertension     Hypothyroid     Inguinal hernia repaired    Right retinal detachment     Ventral hernia

## 2021-05-25 NOTE — H&P PST ADULT - HISTORY OF PRESENT ILLNESS
76 y/o male with history of bladder cancer. Pt reports recent microscopic hematuria.  74 y/o male with history of bladder cancer. Pt reports recent microscopic hematuria. He is here for PST for planned Cystoscopy, ureteroscopy with biopsy.

## 2021-05-25 NOTE — H&P PST ADULT - ASSESSMENT
76 y/o male with history of bladder cancer. Pt reports recent microscopic hematuria. He is scheduled for Cystoscopy, ureteroscopy with biopsy, and/or fulguration of ureteral or renal pelvic lesion.  Plan  1. Stop all NSAIDS, herbal supplements and vitamins for 7 days.  2. NPO  as per ASU instructions.  3. Take the following medications ( Amlodipine, Levothyroxine, Fosinopril ) with small sips of water on the morning of your procedure/surgery.  4. Labs, EKG, CXR as per surgeon. COVID test on 06/01/2021, pt instructed.  5. PMD/cardiologist visit for optimization prior to surgery as per surgeon  6. Advised to quarantine after COVID testing.

## 2021-05-25 NOTE — H&P PST ADULT - NSICDXPASTSURGICALHX_GEN_ALL_CORE_FT
PAST SURGICAL HISTORY:  Detached retina Repair of retina, extraction of cataract with lens implant  2006    H/O cystoscopy multiple with bladder biopsy    History of bladder surgery resection of bladder tumor 2006    History of cataract surgery bilateral    History of right inguinal hernia repair 1970s    Right inguinal hernia 40 years ago    S/P colonoscopy multiple    S/P tonsillectomy as a child

## 2021-05-26 ENCOUNTER — APPOINTMENT (OUTPATIENT)
Dept: NEUROSURGERY | Facility: CLINIC | Age: 76
End: 2021-05-26
Payer: MEDICARE

## 2021-05-26 VITALS
OXYGEN SATURATION: 98 % | HEART RATE: 84 BPM | SYSTOLIC BLOOD PRESSURE: 150 MMHG | BODY MASS INDEX: 28.17 KG/M2 | DIASTOLIC BLOOD PRESSURE: 88 MMHG | TEMPERATURE: 97.6 F | WEIGHT: 208 LBS | HEIGHT: 72 IN

## 2021-05-26 DIAGNOSIS — Z01.818 ENCOUNTER FOR OTHER PREPROCEDURAL EXAMINATION: ICD-10-CM

## 2021-05-26 DIAGNOSIS — M54.5 LOW BACK PAIN: ICD-10-CM

## 2021-05-26 DIAGNOSIS — C65.1 MALIGNANT NEOPLASM OF RIGHT RENAL PELVIS: ICD-10-CM

## 2021-05-26 LAB
CULTURE RESULTS: SIGNIFICANT CHANGE UP
SPECIMEN SOURCE: SIGNIFICANT CHANGE UP

## 2021-05-26 PROCEDURE — 99203 OFFICE O/P NEW LOW 30 MIN: CPT

## 2021-05-26 RX ORDER — NEPAFENAC 3 MG/ML
0.3 SUSPENSION/ DROPS OPHTHALMIC
Qty: 5 | Refills: 0 | Status: DISCONTINUED | COMMUNITY
Start: 2019-06-03 | End: 2021-05-26

## 2021-05-26 RX ORDER — BROMFENAC SODIUM 0.7 MG/ML
0.07 SOLUTION/ DROPS OPHTHALMIC
Qty: 3 | Refills: 0 | Status: DISCONTINUED | COMMUNITY
Start: 2019-04-08 | End: 2021-05-26

## 2021-05-26 RX ORDER — CIPROFLOXACIN HYDROCHLORIDE 500 MG/1
500 TABLET, FILM COATED ORAL
Qty: 14 | Refills: 0 | Status: DISCONTINUED | COMMUNITY
Start: 2020-05-06 | End: 2021-05-26

## 2021-05-26 RX ORDER — CIPROFLOXACIN AND DEXAMETHASONE 3; 1 MG/ML; MG/ML
0.3-0.1 SUSPENSION/ DROPS AURICULAR (OTIC) TWICE DAILY
Qty: 1 | Refills: 3 | Status: DISCONTINUED | COMMUNITY
Start: 2020-12-30 | End: 2021-05-26

## 2021-05-26 RX ORDER — CEFDINIR 300 MG/1
300 CAPSULE ORAL
Qty: 14 | Refills: 0 | Status: DISCONTINUED | COMMUNITY
Start: 2019-05-31 | End: 2021-05-26

## 2021-05-26 RX ORDER — PREDNISONE 10 MG/1
10 TABLET ORAL
Qty: 5 | Refills: 0 | Status: DISCONTINUED | COMMUNITY
Start: 2019-05-31 | End: 2021-05-26

## 2021-05-26 RX ORDER — POLYETHYLENE GLYCOL-3350, SODIUM CHLORIDE, POTASSIUM CHLORIDE AND SODIUM BICARBONATE 420; 11.2; 5.72; 1.48 G/438.4G; G/438.4G; G/438.4G; G/438.4G
420 POWDER, FOR SOLUTION ORAL
Qty: 4000 | Refills: 0 | Status: DISCONTINUED | COMMUNITY
Start: 2019-01-22 | End: 2021-05-26

## 2021-05-31 ENCOUNTER — TRANSCRIPTION ENCOUNTER (OUTPATIENT)
Age: 76
End: 2021-05-31

## 2021-06-01 ENCOUNTER — APPOINTMENT (OUTPATIENT)
Dept: DISASTER EMERGENCY | Facility: CLINIC | Age: 76
End: 2021-06-01

## 2021-06-01 ENCOUNTER — RX RENEWAL (OUTPATIENT)
Age: 76
End: 2021-06-01

## 2021-06-02 NOTE — CONSULT LETTER
[Dear  ___] : Dear  [unfilled], [Courtesy Letter:] : I had the pleasure of seeing your patient, [unfilled], in my office today. [Sincerely,] : Sincerely, [FreeTextEntry2] : Jesus Roca MD\par [address] \[place] [address] [FreeTextEntry1] : Patient is a 76yo male who presents with lumbar symptoms. There was no specific event that lead to his lumbar pain.  Patient with history of bladder Ca dx in 2004. Patient noted to have right kidney lesion on 5/17/21. Patient waiting to undergo biopsy of this lesion.    \par \par Pt also endorses lumbar ache and sharp pain. Patient reports sharp pain with heavy lifting. He denies any electric shooting pin to his legs. He denies numbness, tingling or weakness to legs.  He denies bowel or bladder dysfunction or saddle anesthesia.  He denies any difficulties with walking. \par \par Patient denies fevers or night sweats.  \par \par Motrin provides him with minimal relief. \par \par Patient had undwerwent a Ct of the abdomen 5/5/21 indicating ankylosis spondylosis within the lumbar spine. Degenerative changes noted.  \par \par Pt is alert and oriented. No distress noted. Strength to bilateral legs 5/5. Negative clonus.  Palpation to the lumbar spine does not produce any sharp pain.  Left patellar reflex brisk. Present right patellar reflex. Bilateral Achilles tendon reflexes present and equal. Patient is walking without noted difficulties.  \par \par I provided pt with rx for flexion and extension xrays of the lumbar spine and rx for MRI of the lumbar spine. We will FU over the phone once imaging is available. Pt aware to call with any other questions or concerns.  \par  [FreeTextEntry3] : Alice Hurt, MSN, FNP-BC\par Nurse Practitioner\par Neurosurgery\par [address] [place] [address], [address]nd floor \[place] [address] \par Office: [phone] \par Fax: [phone]\par \par

## 2021-06-03 PROBLEM — R31.9 HEMATURIA, UNSPECIFIED: Chronic | Status: ACTIVE | Noted: 2021-05-25

## 2021-06-03 PROBLEM — K40.90 UNILATERAL INGUINAL HERNIA, WITHOUT OBSTRUCTION OR GANGRENE, NOT SPECIFIED AS RECURRENT: Chronic | Status: ACTIVE | Noted: 2020-01-27

## 2021-06-03 RX ORDER — NEPAFENAC 3 MG/ML
1 SUSPENSION OPHTHALMIC
Qty: 0 | Refills: 0 | DISCHARGE

## 2021-06-03 RX ORDER — AMLODIPINE BESYLATE 2.5 MG/1
1 TABLET ORAL
Qty: 0 | Refills: 0 | DISCHARGE

## 2021-06-03 RX ORDER — LUTEIN 20 MG
0 CAPSULE ORAL
Qty: 0 | Refills: 0 | DISCHARGE

## 2021-06-03 RX ORDER — CHOLECALCIFEROL (VITAMIN D3) 125 MCG
0 CAPSULE ORAL
Qty: 0 | Refills: 0 | DISCHARGE

## 2021-06-03 RX ORDER — LUTEIN 20 MG
1 CAPSULE ORAL
Qty: 0 | Refills: 0 | DISCHARGE

## 2021-06-03 RX ORDER — FOSINOPRIL SODIUM 10 MG/1
1 TABLET ORAL
Qty: 0 | Refills: 0 | DISCHARGE

## 2021-06-03 RX ORDER — PREDNISOLONE SODIUM PHOSPHATE 1 %
1 DROPS OPHTHALMIC (EYE)
Qty: 0 | Refills: 0 | DISCHARGE

## 2021-06-03 RX ORDER — LEVOTHYROXINE SODIUM 125 MCG
1 TABLET ORAL
Qty: 0 | Refills: 0 | DISCHARGE

## 2021-06-03 RX ORDER — FINASTERIDE 5 MG/1
1 TABLET, FILM COATED ORAL
Qty: 0 | Refills: 0 | DISCHARGE

## 2021-06-03 RX ORDER — ATORVASTATIN CALCIUM 80 MG/1
1 TABLET, FILM COATED ORAL
Qty: 0 | Refills: 0 | DISCHARGE

## 2021-06-07 LAB — SARS-COV-2 N GENE NPH QL NAA+PROBE: NOT DETECTED

## 2021-06-07 RX ORDER — FENTANYL CITRATE 50 UG/ML
50 INJECTION INTRAVENOUS
Refills: 0 | Status: DISCONTINUED | OUTPATIENT
Start: 2021-06-08 | End: 2021-06-08

## 2021-06-07 RX ORDER — ONDANSETRON 8 MG/1
4 TABLET, FILM COATED ORAL ONCE
Refills: 0 | Status: DISCONTINUED | OUTPATIENT
Start: 2021-06-08 | End: 2021-06-08

## 2021-06-07 RX ORDER — SODIUM CHLORIDE 9 MG/ML
1000 INJECTION, SOLUTION INTRAVENOUS
Refills: 0 | Status: DISCONTINUED | OUTPATIENT
Start: 2021-06-08 | End: 2021-06-08

## 2021-06-07 RX ORDER — OXYCODONE HYDROCHLORIDE 5 MG/1
10 TABLET ORAL ONCE
Refills: 0 | Status: DISCONTINUED | OUTPATIENT
Start: 2021-06-08 | End: 2021-06-08

## 2021-06-08 ENCOUNTER — RESULT REVIEW (OUTPATIENT)
Age: 76
End: 2021-06-08

## 2021-06-08 ENCOUNTER — OUTPATIENT (OUTPATIENT)
Dept: INPATIENT UNIT | Facility: HOSPITAL | Age: 76
LOS: 1 days | Discharge: HOME | End: 2021-06-08
Payer: MEDICARE

## 2021-06-08 ENCOUNTER — APPOINTMENT (OUTPATIENT)
Dept: UROLOGY | Facility: HOSPITAL | Age: 76
End: 2021-06-08

## 2021-06-08 VITALS
DIASTOLIC BLOOD PRESSURE: 74 MMHG | RESPIRATION RATE: 18 BRPM | SYSTOLIC BLOOD PRESSURE: 140 MMHG | TEMPERATURE: 98 F | HEART RATE: 64 BPM | OXYGEN SATURATION: 95 %

## 2021-06-08 VITALS
WEIGHT: 207.9 LBS | HEIGHT: 72 IN | SYSTOLIC BLOOD PRESSURE: 171 MMHG | TEMPERATURE: 98 F | OXYGEN SATURATION: 98 % | RESPIRATION RATE: 16 BRPM | HEART RATE: 76 BPM | DIASTOLIC BLOOD PRESSURE: 98 MMHG

## 2021-06-08 DIAGNOSIS — Z98.890 OTHER SPECIFIED POSTPROCEDURAL STATES: Chronic | ICD-10-CM

## 2021-06-08 DIAGNOSIS — Z98.49 CATARACT EXTRACTION STATUS, UNSPECIFIED EYE: Chronic | ICD-10-CM

## 2021-06-08 DIAGNOSIS — C65.1 MALIGNANT NEOPLASM OF RIGHT RENAL PELVIS: ICD-10-CM

## 2021-06-08 DIAGNOSIS — K40.90 UNILATERAL INGUINAL HERNIA, WITHOUT OBSTRUCTION OR GANGRENE, NOT SPECIFIED AS RECURRENT: Chronic | ICD-10-CM

## 2021-06-08 DIAGNOSIS — Z90.89 ACQUIRED ABSENCE OF OTHER ORGANS: Chronic | ICD-10-CM

## 2021-06-08 DIAGNOSIS — Z98.89 OTHER SPECIFIED POSTPROCEDURAL STATES: Chronic | ICD-10-CM

## 2021-06-08 DIAGNOSIS — H33.20 SEROUS RETINAL DETACHMENT, UNSPECIFIED EYE: Chronic | ICD-10-CM

## 2021-06-08 PROCEDURE — 76000 FLUOROSCOPY <1 HR PHYS/QHP: CPT

## 2021-06-08 PROCEDURE — 88104 CYTOPATH FL NONGYN SMEARS: CPT | Mod: 26

## 2021-06-08 PROCEDURE — C1769: CPT

## 2021-06-08 PROCEDURE — C1889: CPT

## 2021-06-08 PROCEDURE — 74420 UROGRAPHY RTRGR +-KUB: CPT | Mod: 26,RT

## 2021-06-08 PROCEDURE — 88305 TISSUE EXAM BY PATHOLOGIST: CPT | Mod: 26

## 2021-06-08 PROCEDURE — 88305 TISSUE EXAM BY PATHOLOGIST: CPT

## 2021-06-08 PROCEDURE — 52354 CYSTOURETERO W/BIOPSY: CPT | Mod: RT

## 2021-06-08 PROCEDURE — 88104 CYTOPATH FL NONGYN SMEARS: CPT | Mod: 59

## 2021-06-08 PROCEDURE — C2617: CPT

## 2021-06-08 RX ORDER — AMLODIPINE BESYLATE 2.5 MG/1
2.5 TABLET ORAL DAILY
Refills: 0 | Status: DISCONTINUED | OUTPATIENT
Start: 2021-06-08 | End: 2021-06-08

## 2021-06-08 RX ORDER — ASPIRIN/CALCIUM CARB/MAGNESIUM 324 MG
81 TABLET ORAL DAILY
Refills: 0 | Status: DISCONTINUED | OUTPATIENT
Start: 2021-06-08 | End: 2021-06-08

## 2021-06-08 RX ORDER — LISINOPRIL 2.5 MG/1
20 TABLET ORAL DAILY
Refills: 0 | Status: DISCONTINUED | OUTPATIENT
Start: 2021-06-08 | End: 2021-06-08

## 2021-06-08 RX ORDER — LEVOTHYROXINE SODIUM 125 MCG
200 TABLET ORAL DAILY
Refills: 0 | Status: DISCONTINUED | OUTPATIENT
Start: 2021-06-08 | End: 2021-06-08

## 2021-06-08 RX ORDER — FINASTERIDE 5 MG/1
5 TABLET, FILM COATED ORAL DAILY
Refills: 0 | Status: DISCONTINUED | OUTPATIENT
Start: 2021-06-08 | End: 2021-06-08

## 2021-06-08 NOTE — BRIEF OPERATIVE NOTE - NSICDXBRIEFPROCEDURE_GEN_ALL_CORE_FT
PROCEDURES:  Cystoscopy with right ureteroscopy 08-Jun-2021 10:19:09  Dyreyes, Victor M  Kidney biopsy 08-Jun-2021 10:20:45 Right kidney biopsy Dyreyes, Victor M  Ureteroscopic insertion of right ureteral stent 08-Jun-2021 10:24:42 Right Ureteral Double J stent placement Dyreyes, Victor M

## 2021-06-08 NOTE — ASU DISCHARGE PLAN (ADULT/PEDIATRIC) - CARE PROVIDER_API CALL
Matthew Torres)  Urology  [address] [place] [address]  Phone: [phone]  Fax: [phone]  Follow Up Time:

## 2021-06-14 ENCOUNTER — APPOINTMENT (OUTPATIENT)
Dept: UROLOGY | Facility: CLINIC | Age: 76
End: 2021-06-14
Payer: MEDICARE

## 2021-06-14 VITALS
SYSTOLIC BLOOD PRESSURE: 163 MMHG | HEART RATE: 76 BPM | TEMPERATURE: 97.5 F | RESPIRATION RATE: 15 BRPM | HEIGHT: 72 IN | DIASTOLIC BLOOD PRESSURE: 89 MMHG

## 2021-06-14 LAB
BILIRUB UR QL STRIP: NORMAL
CLARITY UR: CLEAR
COLLECTION METHOD: NORMAL
GLUCOSE UR-MCNC: NORMAL
HCG UR QL: 0.2 EU/DL
HGB UR QL STRIP.AUTO: ABNORMAL
KETONES UR-MCNC: NORMAL
LEUKOCYTE ESTERASE UR QL STRIP: ABNORMAL
NITRITE UR QL STRIP: NORMAL
PH UR STRIP: 5
PROT UR STRIP-MCNC: ABNORMAL
SP GR UR STRIP: 1.02

## 2021-06-14 PROCEDURE — 52310 CYSTOSCOPY AND TREATMENT: CPT

## 2021-06-15 DIAGNOSIS — Z85.51 PERSONAL HISTORY OF MALIGNANT NEOPLASM OF BLADDER: ICD-10-CM

## 2021-06-15 DIAGNOSIS — N40.0 BENIGN PROSTATIC HYPERPLASIA WITHOUT LOWER URINARY TRACT SYMPTOMS: ICD-10-CM

## 2021-06-15 DIAGNOSIS — I10 ESSENTIAL (PRIMARY) HYPERTENSION: ICD-10-CM

## 2021-06-15 DIAGNOSIS — E03.9 HYPOTHYROIDISM, UNSPECIFIED: ICD-10-CM

## 2021-06-15 DIAGNOSIS — Z87.891 PERSONAL HISTORY OF NICOTINE DEPENDENCE: ICD-10-CM

## 2021-06-15 DIAGNOSIS — C65.1 MALIGNANT NEOPLASM OF RIGHT RENAL PELVIS: ICD-10-CM

## 2021-06-15 DIAGNOSIS — R19.00 INTRA-ABDOMINAL AND PELVIC SWELLING, MASS AND LUMP, UNSPECIFIED SITE: ICD-10-CM

## 2021-06-15 DIAGNOSIS — E78.5 HYPERLIPIDEMIA, UNSPECIFIED: ICD-10-CM

## 2021-06-20 LAB — SARS-COV-2 N GENE NPH QL NAA+PROBE: NOT DETECTED

## 2021-06-22 ENCOUNTER — RESULT REVIEW (OUTPATIENT)
Age: 76
End: 2021-06-22

## 2021-06-22 ENCOUNTER — APPOINTMENT (OUTPATIENT)
Dept: UROLOGY | Facility: HOSPITAL | Age: 76
End: 2021-06-22

## 2021-06-22 ENCOUNTER — INPATIENT (INPATIENT)
Facility: HOSPITAL | Age: 76
LOS: 1 days | Discharge: HOME | DRG: 657 | End: 2021-06-24
Attending: UROLOGY | Admitting: UROLOGY
Payer: MEDICARE

## 2021-06-22 VITALS
RESPIRATION RATE: 16 BRPM | WEIGHT: 207.9 LBS | HEART RATE: 72 BPM | OXYGEN SATURATION: 99 % | TEMPERATURE: 97 F | DIASTOLIC BLOOD PRESSURE: 85 MMHG | SYSTOLIC BLOOD PRESSURE: 155 MMHG

## 2021-06-22 DIAGNOSIS — Z98.890 OTHER SPECIFIED POSTPROCEDURAL STATES: Chronic | ICD-10-CM

## 2021-06-22 DIAGNOSIS — C65.1 MALIGNANT NEOPLASM OF RIGHT RENAL PELVIS: ICD-10-CM

## 2021-06-22 DIAGNOSIS — Z98.89 OTHER SPECIFIED POSTPROCEDURAL STATES: Chronic | ICD-10-CM

## 2021-06-22 DIAGNOSIS — K40.90 UNILATERAL INGUINAL HERNIA, WITHOUT OBSTRUCTION OR GANGRENE, NOT SPECIFIED AS RECURRENT: Chronic | ICD-10-CM

## 2021-06-22 DIAGNOSIS — Z90.89 ACQUIRED ABSENCE OF OTHER ORGANS: Chronic | ICD-10-CM

## 2021-06-22 DIAGNOSIS — Z98.49 CATARACT EXTRACTION STATUS, UNSPECIFIED EYE: Chronic | ICD-10-CM

## 2021-06-22 DIAGNOSIS — H33.20 SEROUS RETINAL DETACHMENT, UNSPECIFIED EYE: Chronic | ICD-10-CM

## 2021-06-22 LAB
ANION GAP SERPL CALC-SCNC: 7 MMOL/L — SIGNIFICANT CHANGE UP (ref 5–17)
BUN SERPL-MCNC: 18 MG/DL — SIGNIFICANT CHANGE UP (ref 7–23)
CALCIUM SERPL-MCNC: 8.6 MG/DL — SIGNIFICANT CHANGE UP (ref 8.5–10.1)
CHLORIDE SERPL-SCNC: 108 MMOL/L — SIGNIFICANT CHANGE UP (ref 96–108)
CO2 SERPL-SCNC: 25 MMOL/L — SIGNIFICANT CHANGE UP (ref 22–31)
CREAT SERPL-MCNC: 1.35 MG/DL — HIGH (ref 0.5–1.3)
GLUCOSE SERPL-MCNC: 143 MG/DL — HIGH (ref 70–99)
HCT VFR BLD CALC: 43.9 % — SIGNIFICANT CHANGE UP (ref 39–50)
HGB BLD-MCNC: 14.3 G/DL — SIGNIFICANT CHANGE UP (ref 13–17)
MCHC RBC-ENTMCNC: 28.9 PG — SIGNIFICANT CHANGE UP (ref 27–34)
MCHC RBC-ENTMCNC: 32.6 GM/DL — SIGNIFICANT CHANGE UP (ref 32–36)
MCV RBC AUTO: 88.9 FL — SIGNIFICANT CHANGE UP (ref 80–100)
PLATELET # BLD AUTO: 205 K/UL — SIGNIFICANT CHANGE UP (ref 150–400)
POTASSIUM SERPL-MCNC: 3.6 MMOL/L — SIGNIFICANT CHANGE UP (ref 3.5–5.3)
POTASSIUM SERPL-SCNC: 3.6 MMOL/L — SIGNIFICANT CHANGE UP (ref 3.5–5.3)
RBC # BLD: 4.94 M/UL — SIGNIFICANT CHANGE UP (ref 4.2–5.8)
RBC # FLD: 12.6 % — SIGNIFICANT CHANGE UP (ref 10.3–14.5)
SODIUM SERPL-SCNC: 140 MMOL/L — SIGNIFICANT CHANGE UP (ref 135–145)
WBC # BLD: 15.23 K/UL — HIGH (ref 3.8–10.5)
WBC # FLD AUTO: 15.23 K/UL — HIGH (ref 3.8–10.5)

## 2021-06-22 PROCEDURE — 82247 BILIRUBIN TOTAL: CPT

## 2021-06-22 PROCEDURE — 80048 BASIC METABOLIC PNL TOTAL CA: CPT

## 2021-06-22 PROCEDURE — 85027 COMPLETE CBC AUTOMATED: CPT

## 2021-06-22 PROCEDURE — 50548 LAPARO REMOVE W/URETER: CPT | Mod: AS

## 2021-06-22 PROCEDURE — 86901 BLOOD TYPING SEROLOGIC RH(D): CPT

## 2021-06-22 PROCEDURE — 99221 1ST HOSP IP/OBS SF/LOW 40: CPT

## 2021-06-22 PROCEDURE — 86900 BLOOD TYPING SEROLOGIC ABO: CPT

## 2021-06-22 PROCEDURE — 85025 COMPLETE CBC W/AUTO DIFF WBC: CPT

## 2021-06-22 PROCEDURE — S2900: CPT

## 2021-06-22 PROCEDURE — 36415 COLL VENOUS BLD VENIPUNCTURE: CPT

## 2021-06-22 PROCEDURE — 88309 TISSUE EXAM BY PATHOLOGIST: CPT | Mod: 26

## 2021-06-22 PROCEDURE — 88309 TISSUE EXAM BY PATHOLOGIST: CPT

## 2021-06-22 PROCEDURE — 88307 TISSUE EXAM BY PATHOLOGIST: CPT

## 2021-06-22 PROCEDURE — C1889: CPT

## 2021-06-22 PROCEDURE — 50548 LAPARO REMOVE W/URETER: CPT | Mod: RT

## 2021-06-22 PROCEDURE — 86769 SARS-COV-2 COVID-19 ANTIBODY: CPT

## 2021-06-22 PROCEDURE — 86850 RBC ANTIBODY SCREEN: CPT

## 2021-06-22 PROCEDURE — 88307 TISSUE EXAM BY PATHOLOGIST: CPT | Mod: 26

## 2021-06-22 RX ORDER — OXYCODONE HYDROCHLORIDE 5 MG/1
10 TABLET ORAL ONCE
Refills: 0 | Status: DISCONTINUED | OUTPATIENT
Start: 2021-06-22 | End: 2021-06-22

## 2021-06-22 RX ORDER — FINASTERIDE 5 MG/1
5 TABLET, FILM COATED ORAL DAILY
Refills: 0 | Status: DISCONTINUED | OUTPATIENT
Start: 2021-06-22 | End: 2021-06-24

## 2021-06-22 RX ORDER — ASPIRIN/CALCIUM CARB/MAGNESIUM 324 MG
1 TABLET ORAL
Qty: 0 | Refills: 0 | DISCHARGE

## 2021-06-22 RX ORDER — LEVOTHYROXINE SODIUM 125 MCG
200 TABLET ORAL DAILY
Refills: 0 | Status: DISCONTINUED | OUTPATIENT
Start: 2021-06-22 | End: 2021-06-24

## 2021-06-22 RX ORDER — FENTANYL CITRATE 50 UG/ML
50 INJECTION INTRAVENOUS
Refills: 0 | Status: DISCONTINUED | OUTPATIENT
Start: 2021-06-22 | End: 2021-06-22

## 2021-06-22 RX ORDER — ONDANSETRON 8 MG/1
4 TABLET, FILM COATED ORAL ONCE
Refills: 0 | Status: DISCONTINUED | OUTPATIENT
Start: 2021-06-22 | End: 2021-06-22

## 2021-06-22 RX ORDER — CHOLECALCIFEROL (VITAMIN D3) 125 MCG
0 CAPSULE ORAL
Qty: 0 | Refills: 0 | DISCHARGE

## 2021-06-22 RX ORDER — FINASTERIDE 5 MG/1
1 TABLET, FILM COATED ORAL
Qty: 0 | Refills: 0 | DISCHARGE

## 2021-06-22 RX ORDER — ONDANSETRON 8 MG/1
4 TABLET, FILM COATED ORAL EVERY 6 HOURS
Refills: 0 | Status: DISCONTINUED | OUTPATIENT
Start: 2021-06-22 | End: 2021-06-24

## 2021-06-22 RX ORDER — SODIUM CHLORIDE 9 MG/ML
1000 INJECTION INTRAMUSCULAR; INTRAVENOUS; SUBCUTANEOUS
Refills: 0 | Status: DISCONTINUED | OUTPATIENT
Start: 2021-06-22 | End: 2021-06-24

## 2021-06-22 RX ORDER — HYDROMORPHONE HYDROCHLORIDE 2 MG/ML
1 INJECTION INTRAMUSCULAR; INTRAVENOUS; SUBCUTANEOUS EVERY 4 HOURS
Refills: 0 | Status: DISCONTINUED | OUTPATIENT
Start: 2021-06-22 | End: 2021-06-22

## 2021-06-22 RX ORDER — CHOLECALCIFEROL (VITAMIN D3) 125 MCG
1000 CAPSULE ORAL DAILY
Refills: 0 | Status: DISCONTINUED | OUTPATIENT
Start: 2021-06-22 | End: 2021-06-24

## 2021-06-22 RX ORDER — AMLODIPINE BESYLATE 2.5 MG/1
1 TABLET ORAL
Qty: 0 | Refills: 0 | DISCHARGE

## 2021-06-22 RX ORDER — LISINOPRIL 2.5 MG/1
20 TABLET ORAL DAILY
Refills: 0 | Status: DISCONTINUED | OUTPATIENT
Start: 2021-06-22 | End: 2021-06-23

## 2021-06-22 RX ORDER — HEPARIN SODIUM 5000 [USP'U]/ML
5000 INJECTION INTRAVENOUS; SUBCUTANEOUS EVERY 12 HOURS
Refills: 0 | Status: DISCONTINUED | OUTPATIENT
Start: 2021-06-22 | End: 2021-06-24

## 2021-06-22 RX ORDER — LUTEIN 20 MG
0 CAPSULE ORAL
Qty: 0 | Refills: 0 | DISCHARGE

## 2021-06-22 RX ORDER — SODIUM CHLORIDE 9 MG/ML
1000 INJECTION, SOLUTION INTRAVENOUS
Refills: 0 | Status: DISCONTINUED | OUTPATIENT
Start: 2021-06-22 | End: 2021-06-22

## 2021-06-22 RX ORDER — FOLIC ACID 0.8 MG
1 TABLET ORAL
Qty: 0 | Refills: 0 | DISCHARGE

## 2021-06-22 RX ORDER — CEFAZOLIN SODIUM 1 G
2000 VIAL (EA) INJECTION EVERY 8 HOURS
Refills: 0 | Status: COMPLETED | OUTPATIENT
Start: 2021-06-22 | End: 2021-06-23

## 2021-06-22 RX ORDER — LEVOTHYROXINE SODIUM 125 MCG
1 TABLET ORAL
Qty: 0 | Refills: 0 | DISCHARGE

## 2021-06-22 RX ORDER — ATORVASTATIN CALCIUM 80 MG/1
20 TABLET, FILM COATED ORAL AT BEDTIME
Refills: 0 | Status: DISCONTINUED | OUTPATIENT
Start: 2021-06-22 | End: 2021-06-24

## 2021-06-22 RX ORDER — ATORVASTATIN CALCIUM 80 MG/1
1 TABLET, FILM COATED ORAL
Qty: 0 | Refills: 0 | DISCHARGE

## 2021-06-22 RX ORDER — OXYCODONE HYDROCHLORIDE 5 MG/1
5 TABLET ORAL EVERY 6 HOURS
Refills: 0 | Status: DISCONTINUED | OUTPATIENT
Start: 2021-06-22 | End: 2021-06-24

## 2021-06-22 RX ORDER — AMLODIPINE BESYLATE 2.5 MG/1
2.5 TABLET ORAL DAILY
Refills: 0 | Status: DISCONTINUED | OUTPATIENT
Start: 2021-06-22 | End: 2021-06-24

## 2021-06-22 RX ORDER — HYDROMORPHONE HYDROCHLORIDE 2 MG/ML
0.5 INJECTION INTRAMUSCULAR; INTRAVENOUS; SUBCUTANEOUS EVERY 4 HOURS
Refills: 0 | Status: DISCONTINUED | OUTPATIENT
Start: 2021-06-22 | End: 2021-06-24

## 2021-06-22 RX ORDER — FOSINOPRIL SODIUM 10 MG/1
1 TABLET ORAL
Qty: 0 | Refills: 0 | DISCHARGE

## 2021-06-22 RX ADMIN — OXYCODONE HYDROCHLORIDE 10 MILLIGRAM(S): 5 TABLET ORAL at 13:53

## 2021-06-22 RX ADMIN — SODIUM CHLORIDE 100 MILLILITER(S): 9 INJECTION INTRAMUSCULAR; INTRAVENOUS; SUBCUTANEOUS at 13:53

## 2021-06-22 RX ADMIN — Medication 100 MILLIGRAM(S): at 22:34

## 2021-06-22 RX ADMIN — SODIUM CHLORIDE 100 MILLILITER(S): 9 INJECTION INTRAMUSCULAR; INTRAVENOUS; SUBCUTANEOUS at 22:36

## 2021-06-22 RX ADMIN — OXYCODONE HYDROCHLORIDE 5 MILLIGRAM(S): 5 TABLET ORAL at 17:25

## 2021-06-22 RX ADMIN — OXYCODONE HYDROCHLORIDE 10 MILLIGRAM(S): 5 TABLET ORAL at 13:13

## 2021-06-22 RX ADMIN — OXYCODONE HYDROCHLORIDE 5 MILLIGRAM(S): 5 TABLET ORAL at 23:32

## 2021-06-22 RX ADMIN — HEPARIN SODIUM 5000 UNIT(S): 5000 INJECTION INTRAVENOUS; SUBCUTANEOUS at 22:35

## 2021-06-22 RX ADMIN — ATORVASTATIN CALCIUM 20 MILLIGRAM(S): 80 TABLET, FILM COATED ORAL at 22:34

## 2021-06-22 NOTE — PATIENT PROFILE ADULT - STATED REASON FOR ADMISSION
Unable to formally assess: Pt. responding "no" to a number of questions and directions; appears to be WFl at least 3+/5 throughout on observation Robotically assisted nephro Ureterectomy

## 2021-06-22 NOTE — CHART NOTE - NSCHARTNOTEFT_GEN_A_CORE
Pt seen and examined at bedside s/p robotic nephroureterectomy, lymph node dissection today.  Pt without complaints of pain at this time and is sitting comfortably in bed.  He would like to ambulate.  He is tolerating a full diet and using his incentive spirometer.    ICU Vital Signs Last 24 Hrs  T(C): 36.3 (22 Jun 2021 14:26), Max: 36.3 (22 Jun 2021 06:21)  T(F): 97.3 (22 Jun 2021 14:26), Max: 97.4 (22 Jun 2021 06:21)  HR: 69 (22 Jun 2021 14:26) (64 - 72)  BP: 140/70 (22 Jun 2021 14:26) (111/56 - 155/85)  BP(mean): --  ABP: --  ABP(mean): --  RR: 17 (22 Jun 2021 14:26) (12 - 18)  SpO2: 97% (22 Jun 2021 14:26) (95% - 100%)                          14.3   15.23 )-----------( 205      ( 22 Jun 2021 13:03 )             43.9     06-22    140  |  108  |  18  ----------------------------<  143<H>  3.6   |  25  |  1.35<H>    Ca    8.6      22 Jun 2021 13:03    22 Jun 2021 07:01  -  22 Jun 2021 16:43  --------------------------------------------------------  IN:    Other (mL): 1200 mL    sodium chloride 0.9%: 144 mL  Total IN: 1344 mL    OUT:    Bulb (mL): 10 mL    Indwelling Catheter - Urethral (mL): 125 mL    Other (mL): 580 mL  Total OUT: 715 mL    Total NET: 629 mL    Physical Exam-  CV-RRR, S1, S2  Lungs- CTA b/L no wheezing  Abd- GAY in place, incisions closed, no signs of infection and no hemorrhage. +BS, non-tender, non-distended.  Ortega in place and draining.    Assessment-  s/p robotic right nephroureterectomy, lymph node dissection, POD#0    Plan-  PO diet, OOB-Ambulating, Continue GAY and ortega, monitor overnight, likely d/c GAY in am and d/c home, meds per home/hospitalist, Continue ortega as outpatient, f/u labs in am, DVT proph- SC heparin.  d/w Dr. Torres.

## 2021-06-22 NOTE — CONSULT NOTE ADULT - ATTENDING COMMENTS
Pt seen and examined on 1N earlier today. d/w np margie mcknight. agree with documentation as above   finished his meal. no n/v. states he feels surprisingly well. family at bedside.  Hoping to go home tomorrow.   eager to ambulate.   incentive spirometry  pain control    thank you, will follow.

## 2021-06-22 NOTE — BRIEF OPERATIVE NOTE - NSICDXBRIEFPROCEDURE_GEN_ALL_CORE_FT
PROCEDURES:  Right nephroureterectomy 22-Jun-2021 13:11:23 robotic assisted, para-aortic lymph node dissection Fercho Brooks

## 2021-06-22 NOTE — CONSULT NOTE ADULT - SUBJECTIVE AND OBJECTIVE BOX
PCP: Dr REINA Roca    CHIEF COMPLAINT: Renal Ca    HISTORY OF THE PRESENT ILLNESS: this is a 74 yo male with PMH of HTN, HLD, hypothyroidism, BPH, bladder CA S/P BCG treatments who was found on workup to have right renal pelvis and ureter CA and is now S/P right nephroureterectomy  with lymph node dissection today.  Pt is seen on 1 [place]  PAST MEDICAL HISTORY: as above    PAST SURGICAL HISTORY: inguinal hernia repair, cystoscopy, bladder surgery, cataracts extraction, multiple colonoscopies, tonsillectomy    FAMILY HISTORY:   father dec CAD, age unknown, Mother dec colon Ca age unknown     SOCIAL HISTORY:  former smoker 30 pk years quit 25 yrs ago, social alcohol, no drugs    ALLERGIES: NKDA    HOME MEDS: see med rec    REVIEW OF SYSTEMS:   All 10 systems reviewed in detailed and found to be negative with the exception of what has already been described above    MEDICATIONS  (STANDING):  amLODIPine   Tablet 2.5 milliGRAM(s) Oral daily  atorvastatin 20 milliGRAM(s) Oral at bedtime  ceFAZolin   IVPB 2000 milliGRAM(s) IV Intermittent every 8 hours  cholecalciferol 1000 Unit(s) Oral daily  finasteride 5 milliGRAM(s) Oral daily  heparin   Injectable 5000 Unit(s) SubCutaneous every 12 hours  levothyroxine 200 MICROGram(s) Oral daily  lisinopril 20 milliGRAM(s) Oral daily  multivitamin 1 Tablet(s) Oral daily  sodium chloride 0.9%. 1000 milliLiter(s) (100 mL/Hr) IV Continuous <Continuous>    MEDICATIONS  (PRN):  HYDROmorphone  Injectable 0.5 milliGRAM(s) IV Push every 4 hours PRN Severe Pain (7 - 10)  ondansetron Injectable 4 milliGRAM(s) IV Push every 6 hours PRN Nausea  oxyCODONE    IR 5 milliGRAM(s) Oral every 6 hours PRN Moderate Pain (4 - 6)      VITALS:  T(F): 97.3 (06-22-21 @ 14:26), Max: 97.4 (06-22-21 @ 06:21)  HR: 69 (06-22-21 @ 14:26) (64 - 72)  BP: 140/70 (06-22-21 @ 14:26) (111/56 - 155/85)  RR: 17 (06-22-21 @ 14:26) (12 - 18)  SpO2: 97% (06-22-21 @ 14:26) (95% - 100%)  Wt(kg): --    I&O's Summary    22 Jun 2021 07:01  -  22 Jun 2021 15:11  --------------------------------------------------------  IN: 1344 mL / OUT: 715 mL / NET: 629 mL        CAPILLARY BLOOD GLUCOSE      PHYSICAL EXAM:    GENERAL: Comfortable, no acute distress   HEAD:  Normocephalic, atraumatic  EYES: EOMI, PERRLA  HEENT: Moist mucous membranes  NECK: Supple, No JVD  NERVOUS SYSTEM:  Alert & Oriented X3, Motor Strength 5/5 B/L upper and lower extremities  CHEST/LUNG: Clear to auscultation bilaterally  HEART: Regular rate and rhythm  ABDOMEN: Soft, non tender, Nondistended, Bowel sounds present  GENITOURINARY: Voiding, no palpable bladder  EXTREMITIES:   No clubbing, cyanosis, or edema  MUSCULOSKELETAL- No muscle tenderness, no joint tenderness  SKIN-no rash            LABS:                            14.3   15.23 )-----------( 205      ( 22 Jun 2021 13:03 )             43.9     06-22    140  |  108  |  18  ----------------------------<  143<H>  3.6   |  25  |  1.35<H>    Ca    8.6      22 Jun 2021 13:03            IMPRESSION:  74 yo male with above pmh a/w:  # right renal/ureter Ca  # S/P right nephroureterectomy     POD#0    Pain Control   IVF's   ABXs   Pacheco   GAY drain  monitor CBC/BMP    # HTN  cont CCB/ACE-I    # BPH  cont finasteride    #hypothyroidism   cont levothyroxine     # Vte prophylaxis  Heparin sq  venodynes  INC mobility      thank you for the consult, will follow           PCP: Dr REINA Roca    CHIEF COMPLAINT: Renal Ca    HISTORY OF THE PRESENT ILLNESS: this is a 74 yo male with PMH of HTN, HLD, hypothyroidism, BPH, bladder CA S/P BCG treatments who was found on workup to have right renal pelvis and ureter CA and is now S/P right nephroureterectomy  with lymph node dissection today.  [place], alert awake, eating lunch, states pain is tolerable, denies any N/V, NO CP or SOB.  States he took his B/P meds this am. We are consulted for medical management    PAST MEDICAL HISTORY: as above    PAST SURGICAL HISTORY: inguinal hernia repair, cystoscopy, bladder surgery, cataracts extraction, multiple colonoscopies, tonsillectomy    FAMILY HISTORY:   father dec CAD, age unknown, Mother dec colon Ca age unknown     SOCIAL HISTORY:  former smoker 30 pk years quit 25 yrs ago, social alcohol, no drugs    ALLERGIES: NKDA    HOME MEDS: see med rec    REVIEW OF SYSTEMS:   All 10 systems reviewed in detailed and found to be negative with the exception of what has already been described above    MEDICATIONS  (STANDING):  amLODIPine   Tablet 2.5 milliGRAM(s) Oral daily  atorvastatin 20 milliGRAM(s) Oral at bedtime  ceFAZolin   IVPB 2000 milliGRAM(s) IV Intermittent every 8 hours  cholecalciferol 1000 Unit(s) Oral daily  finasteride 5 milliGRAM(s) Oral daily  heparin   Injectable 5000 Unit(s) SubCutaneous every 12 hours  levothyroxine 200 MICROGram(s) Oral daily  lisinopril 20 milliGRAM(s) Oral daily  multivitamin 1 Tablet(s) Oral daily  sodium chloride 0.9%. 1000 milliLiter(s) (100 mL/Hr) IV Continuous <Continuous>    MEDICATIONS  (PRN):  HYDROmorphone  Injectable 0.5 milliGRAM(s) IV Push every 4 hours PRN Severe Pain (7 - 10)  ondansetron Injectable 4 milliGRAM(s) IV Push every 6 hours PRN Nausea  oxyCODONE    IR 5 milliGRAM(s) Oral every 6 hours PRN Moderate Pain (4 - 6)      VITALS:  T(F): 97.3 (06-22-21 @ 14:26), Max: 97.4 (06-22-21 @ 06:21)  HR: 69 (06-22-21 @ 14:26) (64 - 72)  BP: 140/70 (06-22-21 @ 14:26) (111/56 - 155/85)  RR: 17 (06-22-21 @ 14:26) (12 - 18)  SpO2: 97% (06-22-21 @ 14:26) (95% - 100%)  Wt(kg): --    I&O's Summary    22 Jun 2021 07:01  -  22 Jun 2021 15:11  --------------------------------------------------------  IN: 1344 mL / OUT: 715 mL / NET: 629 mL        CAPILLARY BLOOD GLUCOSE      PHYSICAL EXAM:    GENERAL: Comfortable, no acute distress   HEAD:  Normocephalic, atraumatic  EYES: EOMI, PERRLA  HEENT: Moist mucous membranes  NECK: Supple, No JVD  NERVOUS SYSTEM:  Alert & Oriented X3, Motor Strength 5/5 B/L upper and lower extremities  CHEST/LUNG: Clear to auscultation bilaterally  HEART: Regular rate and rhythm  ABDOMEN: Soft, post op tenderness , Nondistended, Bowel sounds hypoactive, linear incision and lap sites c/d/i  GENITOURINARY: ortega  EXTREMITIES:   No clubbing, cyanosis, or edema  MUSCULOSKELETAL- No muscle tenderness, no joint tenderness  SKIN-no rash        LABS:                            14.3   15.23 )-----------( 205      ( 22 Jun 2021 13:03 )             43.9     06-22    140  |  108  |  18  ----------------------------<  143<H>  3.6   |  25  |  1.35<H>    Ca    8.6      22 Jun 2021 13:03            IMPRESSION:  74 yo male with above pmh a/w:  # right renal/ureter Ca  # S/P right nephroureterectomy     POD#0    Pain Control   IVF's   ABXs   Ortega   GAY drain  monitor CBC/BMP    #Leukocytosis   likely reactive   afebrile   monitor wbc    # HTN  cont CCB/ACE-I    # BPH  cont finasteride    #hypothyroidism   cont levothyroxine     # Vte prophylaxis  Heparin sq  venodynes  INC mobility      thank you for the consult, will follow           PCP: Dr REINA Roca    CHIEF COMPLAINT: Renal Ca    HISTORY OF THE PRESENT ILLNESS: this is a 74 yo male with PMH of HTN, HLD, hypothyroidism, BPH, bladder CA S/P BCG treatments who was found on workup to have right renal pelvis and ureter CA and is now S/P right nephroureterectomy  with lymph node dissection today.  [place], alert awake, eating lunch, states pain is tolerable, denies any N/V, NO CP or SOB.  States he took his B/P meds this am. We are consulted for medical management    PAST MEDICAL HISTORY: as above    PAST SURGICAL HISTORY: inguinal hernia repair, cystoscopy, bladder surgery, cataracts extraction, multiple colonoscopies, tonsillectomy    FAMILY HISTORY:   father dec CAD, age unknown, Mother dec colon Ca age unknown     SOCIAL HISTORY:  former smoker 30 pk years quit 25 yrs ago, social alcohol, no drugs    ALLERGIES: NKDA    HOME MEDS: see med rec    REVIEW OF SYSTEMS:   All 10 systems reviewed in detailed and found to be negative with the exception of what has already been described above    MEDICATIONS  (STANDING):  amLODIPine   Tablet 2.5 milliGRAM(s) Oral daily  atorvastatin 20 milliGRAM(s) Oral at bedtime  ceFAZolin   IVPB 2000 milliGRAM(s) IV Intermittent every 8 hours  cholecalciferol 1000 Unit(s) Oral daily  finasteride 5 milliGRAM(s) Oral daily  heparin   Injectable 5000 Unit(s) SubCutaneous every 12 hours  levothyroxine 200 MICROGram(s) Oral daily  lisinopril 20 milliGRAM(s) Oral daily  multivitamin 1 Tablet(s) Oral daily  sodium chloride 0.9%. 1000 milliLiter(s) (100 mL/Hr) IV Continuous <Continuous>    MEDICATIONS  (PRN):  HYDROmorphone  Injectable 0.5 milliGRAM(s) IV Push every 4 hours PRN Severe Pain (7 - 10)  ondansetron Injectable 4 milliGRAM(s) IV Push every 6 hours PRN Nausea  oxyCODONE    IR 5 milliGRAM(s) Oral every 6 hours PRN Moderate Pain (4 - 6)      VITALS:  T(F): 97.3 (06-22-21 @ 14:26), Max: 97.4 (06-22-21 @ 06:21)  HR: 69 (06-22-21 @ 14:26) (64 - 72)  BP: 140/70 (06-22-21 @ 14:26) (111/56 - 155/85)  RR: 17 (06-22-21 @ 14:26) (12 - 18)  SpO2: 97% (06-22-21 @ 14:26) (95% - 100%)    CAPILLARY BLOOD GLUCOSE      PHYSICAL EXAM:    GENERAL: Comfortable, no acute distress   HEAD:  Normocephalic, atraumatic  EYES: EOMI, PERRLA  HEENT: Moist mucous membranes  NECK: Supple, No JVD  NERVOUS SYSTEM:  Alert & Oriented X3, Motor Strength 5/5 B/L upper and lower extremities  CHEST/LUNG: Clear to auscultation bilaterally  HEART: Regular rate and rhythm  ABDOMEN: Soft, post op tenderness , Nondistended, Bowel sounds hypoactive, linear incision and lap sites c/d/i  GENITOURINARY: ortega  EXTREMITIES:   No clubbing, cyanosis, or edema  MUSCULOSKELETAL- No muscle tenderness, no joint tenderness  SKIN-no rash        LABS:                            14.3   15.23 )-----------( 205      ( 22 Jun 2021 13:03 )             43.9     06-22    140  |  108  |  18  ----------------------------<  143<H>  3.6   |  25  |  1.35<H>    Ca    8.6      22 Jun 2021 13:03            IMPRESSION:  74 yo male with above pmh a/w:    # right renal/ureter Ca  # S/P right nephroureterectomy     POD#0    Pain Control   IVF's   ABXs   Ortega   GAY drain  monitor CBC/BMP    #Leukocytosis   likely reactive   afebrile   monitor wbc    # HTN  cont CCB/ACE-I    # BPH  cont finasteride    #hypothyroidism   cont levothyroxine     # Vte prophylaxis  Heparin sq  venodynes  INC mobility      thank you for the consult, will follow

## 2021-06-22 NOTE — PATIENT PROFILE ADULT - NSPROGENBLOODRESTRICT_GEN_A_NUR
[>50% of Time Spent on Counseling for ____] : Greater than 50% of the encounter time was spent on counseling for [unfilled] none [Time Spent: ___ minutes] : I have spent [unfilled] minutes of face to face time with the patient

## 2021-06-23 ENCOUNTER — APPOINTMENT (OUTPATIENT)
Dept: UROLOGY | Facility: CLINIC | Age: 76
End: 2021-06-23

## 2021-06-23 LAB
ADD ON TEST-SPECIMEN IN LAB: SIGNIFICANT CHANGE UP
ANION GAP SERPL CALC-SCNC: 5 MMOL/L — SIGNIFICANT CHANGE UP (ref 5–17)
ANION GAP SERPL CALC-SCNC: 6 MMOL/L — SIGNIFICANT CHANGE UP (ref 5–17)
BASOPHILS # BLD AUTO: 0.05 K/UL — SIGNIFICANT CHANGE UP (ref 0–0.2)
BASOPHILS NFR BLD AUTO: 0.3 % — SIGNIFICANT CHANGE UP (ref 0–2)
BUN SERPL-MCNC: 22 MG/DL — SIGNIFICANT CHANGE UP (ref 7–23)
BUN SERPL-MCNC: 23 MG/DL — SIGNIFICANT CHANGE UP (ref 7–23)
CALCIUM SERPL-MCNC: 8.1 MG/DL — LOW (ref 8.5–10.1)
CALCIUM SERPL-MCNC: 8.4 MG/DL — LOW (ref 8.5–10.1)
CHLORIDE SERPL-SCNC: 107 MMOL/L — SIGNIFICANT CHANGE UP (ref 96–108)
CHLORIDE SERPL-SCNC: 108 MMOL/L — SIGNIFICANT CHANGE UP (ref 96–108)
CO2 SERPL-SCNC: 26 MMOL/L — SIGNIFICANT CHANGE UP (ref 22–31)
CO2 SERPL-SCNC: 26 MMOL/L — SIGNIFICANT CHANGE UP (ref 22–31)
COVID-19 SPIKE DOMAIN AB INTERP: POSITIVE
COVID-19 SPIKE DOMAIN ANTIBODY RESULT: >250 U/ML — HIGH
CREAT SERPL-MCNC: 1.75 MG/DL — HIGH (ref 0.5–1.3)
CREAT SERPL-MCNC: 2 MG/DL — HIGH (ref 0.5–1.3)
EOSINOPHIL # BLD AUTO: 0.27 K/UL — SIGNIFICANT CHANGE UP (ref 0–0.5)
EOSINOPHIL NFR BLD AUTO: 1.6 % — SIGNIFICANT CHANGE UP (ref 0–6)
GLUCOSE SERPL-MCNC: 114 MG/DL — HIGH (ref 70–99)
GLUCOSE SERPL-MCNC: 123 MG/DL — HIGH (ref 70–99)
HCT VFR BLD CALC: 37.9 % — LOW (ref 39–50)
HCT VFR BLD CALC: 39.9 % — SIGNIFICANT CHANGE UP (ref 39–50)
HGB BLD-MCNC: 12.3 G/DL — LOW (ref 13–17)
HGB BLD-MCNC: 13.1 G/DL — SIGNIFICANT CHANGE UP (ref 13–17)
IMM GRANULOCYTES NFR BLD AUTO: 0.4 % — SIGNIFICANT CHANGE UP (ref 0–1.5)
LYMPHOCYTES # BLD AUTO: 1.5 K/UL — SIGNIFICANT CHANGE UP (ref 1–3.3)
LYMPHOCYTES # BLD AUTO: 9.1 % — LOW (ref 13–44)
MCHC RBC-ENTMCNC: 28.9 PG — SIGNIFICANT CHANGE UP (ref 27–34)
MCHC RBC-ENTMCNC: 29.4 PG — SIGNIFICANT CHANGE UP (ref 27–34)
MCHC RBC-ENTMCNC: 32.5 GM/DL — SIGNIFICANT CHANGE UP (ref 32–36)
MCHC RBC-ENTMCNC: 32.8 GM/DL — SIGNIFICANT CHANGE UP (ref 32–36)
MCV RBC AUTO: 89 FL — SIGNIFICANT CHANGE UP (ref 80–100)
MCV RBC AUTO: 89.5 FL — SIGNIFICANT CHANGE UP (ref 80–100)
MONOCYTES # BLD AUTO: 1.27 K/UL — HIGH (ref 0–0.9)
MONOCYTES NFR BLD AUTO: 7.7 % — SIGNIFICANT CHANGE UP (ref 2–14)
NEUTROPHILS # BLD AUTO: 13.27 K/UL — HIGH (ref 1.8–7.4)
NEUTROPHILS NFR BLD AUTO: 80.9 % — HIGH (ref 43–77)
PLATELET # BLD AUTO: 195 K/UL — SIGNIFICANT CHANGE UP (ref 150–400)
PLATELET # BLD AUTO: 226 K/UL — SIGNIFICANT CHANGE UP (ref 150–400)
POTASSIUM SERPL-MCNC: 4.5 MMOL/L — SIGNIFICANT CHANGE UP (ref 3.5–5.3)
POTASSIUM SERPL-MCNC: 4.6 MMOL/L — SIGNIFICANT CHANGE UP (ref 3.5–5.3)
POTASSIUM SERPL-SCNC: 4.5 MMOL/L — SIGNIFICANT CHANGE UP (ref 3.5–5.3)
POTASSIUM SERPL-SCNC: 4.6 MMOL/L — SIGNIFICANT CHANGE UP (ref 3.5–5.3)
RBC # BLD: 4.26 M/UL — SIGNIFICANT CHANGE UP (ref 4.2–5.8)
RBC # BLD: 4.46 M/UL — SIGNIFICANT CHANGE UP (ref 4.2–5.8)
RBC # FLD: 13 % — SIGNIFICANT CHANGE UP (ref 10.3–14.5)
RBC # FLD: 13.2 % — SIGNIFICANT CHANGE UP (ref 10.3–14.5)
SARS-COV-2 IGG+IGM SERPL QL IA: >250 U/ML — HIGH
SARS-COV-2 IGG+IGM SERPL QL IA: POSITIVE
SODIUM SERPL-SCNC: 139 MMOL/L — SIGNIFICANT CHANGE UP (ref 135–145)
SODIUM SERPL-SCNC: 139 MMOL/L — SIGNIFICANT CHANGE UP (ref 135–145)
WBC # BLD: 16.42 K/UL — HIGH (ref 3.8–10.5)
WBC # BLD: 17.63 K/UL — HIGH (ref 3.8–10.5)
WBC # FLD AUTO: 16.42 K/UL — HIGH (ref 3.8–10.5)
WBC # FLD AUTO: 17.63 K/UL — HIGH (ref 3.8–10.5)

## 2021-06-23 PROCEDURE — 12345: CPT | Mod: NC

## 2021-06-23 PROCEDURE — 99233 SBSQ HOSP IP/OBS HIGH 50: CPT

## 2021-06-23 PROCEDURE — 99231 SBSQ HOSP IP/OBS SF/LOW 25: CPT

## 2021-06-23 RX ADMIN — AMLODIPINE BESYLATE 2.5 MILLIGRAM(S): 2.5 TABLET ORAL at 09:16

## 2021-06-23 RX ADMIN — FINASTERIDE 5 MILLIGRAM(S): 5 TABLET, FILM COATED ORAL at 09:16

## 2021-06-23 RX ADMIN — HEPARIN SODIUM 5000 UNIT(S): 5000 INJECTION INTRAVENOUS; SUBCUTANEOUS at 18:20

## 2021-06-23 RX ADMIN — OXYCODONE HYDROCHLORIDE 5 MILLIGRAM(S): 5 TABLET ORAL at 03:28

## 2021-06-23 RX ADMIN — SODIUM CHLORIDE 100 MILLILITER(S): 9 INJECTION INTRAMUSCULAR; INTRAVENOUS; SUBCUTANEOUS at 11:00

## 2021-06-23 RX ADMIN — Medication 1 TABLET(S): at 09:16

## 2021-06-23 RX ADMIN — OXYCODONE HYDROCHLORIDE 5 MILLIGRAM(S): 5 TABLET ORAL at 13:03

## 2021-06-23 RX ADMIN — Medication 100 MILLIGRAM(S): at 06:23

## 2021-06-23 RX ADMIN — Medication 1000 UNIT(S): at 09:16

## 2021-06-23 RX ADMIN — ATORVASTATIN CALCIUM 20 MILLIGRAM(S): 80 TABLET, FILM COATED ORAL at 21:28

## 2021-06-23 RX ADMIN — SODIUM CHLORIDE 100 MILLILITER(S): 9 INJECTION INTRAMUSCULAR; INTRAVENOUS; SUBCUTANEOUS at 21:28

## 2021-06-23 RX ADMIN — OXYCODONE HYDROCHLORIDE 5 MILLIGRAM(S): 5 TABLET ORAL at 18:20

## 2021-06-23 RX ADMIN — HEPARIN SODIUM 5000 UNIT(S): 5000 INJECTION INTRAVENOUS; SUBCUTANEOUS at 06:23

## 2021-06-23 RX ADMIN — Medication 200 MICROGRAM(S): at 06:23

## 2021-06-23 NOTE — PROGRESS NOTE ADULT - SUBJECTIVE AND OBJECTIVE BOX
PCP: Dr REINA Roca    CHIEF COMPLAINT: Renal Ca    HISTORY OF THE PRESENT ILLNESS: this is a 76 yo male with PMH of HTN, HLD, hypothyroidism, BPH, bladder CA S/P BCG treatments who was found on workup to have right renal pelvis and ureter CA and is now S/P right nephroureterectomy  with lymph node dissection. Hospitalist service consulted for medical comanagement.     6/23: pt seen and examined. Feels well. Ambulating. Tolerating po. Good ortega outpt. GAY in place.       REVIEW OF SYSTEMS:   All 10 systems reviewed in detailed and found to be negative with the exception of what has already been described above    Vital Signs Last 24 Hrs  T(C): 36.6 (23 Jun 2021 09:15), Max: 36.9 (23 Jun 2021 04:47)  T(F): 97.9 (23 Jun 2021 09:15), Max: 98.4 (23 Jun 2021 04:47)  HR: 73 (23 Jun 2021 09:15) (69 - 102)  BP: 125/61 (23 Jun 2021 09:15) (113/70 - 125/61)  RR: 18 (23 Jun 2021 09:15) (18 - 18)  SpO2: 97% (23 Jun 2021 09:15) (95% - 97%)    PHYSICAL EXAM:    GENERAL: Comfortable, no acute distress   HEAD:  Normocephalic, atraumatic  EYES: EOMI, PERRLA  HEENT: Moist mucous membranes  NECK: Supple, No JVD  NERVOUS SYSTEM:  Alert & Oriented X3, Motor Strength 5/5 B/L upper and lower extremities  CHEST/LUNG: Clear to auscultation bilaterally  HEART: Regular rate and rhythm  ABDOMEN: Soft, post op tenderness , Nondistended, Bowel sounds +, linear incision and lap sites c/d/i. +GAY  GENITOURINARY: ortega with light colored urine.  EXTREMITIES:   No clubbing, cyanosis, or edema  MUSCULOSKELETAL- No muscle tenderness, no joint tenderness  SKIN-no rash  LABS:                        13.1   16.42 )-----------( 226      ( 23 Jun 2021 11:24 )             39.9     06-23    139  |  107  |  22  ----------------------------<  123<H>  4.5   |  26  |  2.00<H>    Ca    8.4<L>      23 Jun 2021 11:24        MEDICATIONS  (STANDING):  amLODIPine   Tablet 2.5 milliGRAM(s) Oral daily  atorvastatin 20 milliGRAM(s) Oral at bedtime  cholecalciferol 1000 Unit(s) Oral daily  finasteride 5 milliGRAM(s) Oral daily  heparin   Injectable 5000 Unit(s) SubCutaneous every 12 hours  levothyroxine 200 MICROGram(s) Oral daily  multivitamin 1 Tablet(s) Oral daily  sodium chloride 0.9%. 1000 milliLiter(s) (100 mL/Hr) IV Continuous <Continuous>    MEDICATIONS  (PRN):  HYDROmorphone  Injectable 0.5 milliGRAM(s) IV Push every 4 hours PRN Severe Pain (7 - 10)  ondansetron Injectable 4 milliGRAM(s) IV Push every 6 hours PRN Nausea  oxyCODONE    IR 5 milliGRAM(s) Oral every 6 hours PRN Moderate Pain (4 - 6)      IMPRESSION:  76 yo male with above pmh a/w:    # right renal/ureter Ca  # S/P right nephroureterectomy  -pod#1  -pain control  -ambulate  -monitor GAY and ortega outpt.   -incentive spirometry  -bowel regimen.      #JOSIANE:  -?related to surgery.   -ivf.   -dc lisinopril    #Leukocytosis  -monitor closely for s/s of infn.     # HTN  -cont CCB  -hold ace-i as above    # BPH  -cont finasteride    #hypothyroidism  - cont levothyroxine     #HLD:  -statin    # Vte prophylaxis  Heparin sq    d/w patient/family at bedside

## 2021-06-23 NOTE — PROGRESS NOTE ADULT - SUBJECTIVE AND OBJECTIVE BOX
75y Male with POD1  s/p robotic nephroureterectomy. Postop pt feeling great, states pain improved after pain medication. He reports that he sat on drain last night and contents spilled. He ate a last night, feels hungry today and is walking    Denies CP, palpitation, SOB, N/V/abdominal pain    amLODIPine   Tablet 2.5 milliGRAM(s) Oral daily  atorvastatin 20 milliGRAM(s) Oral at bedtime  cholecalciferol 1000 Unit(s) Oral daily  finasteride 5 milliGRAM(s) Oral daily  heparin   Injectable 5000 Unit(s) SubCutaneous every 12 hours  HYDROmorphone  Injectable 0.5 milliGRAM(s) IV Push every 4 hours PRN  levothyroxine 200 MICROGram(s) Oral daily  multivitamin 1 Tablet(s) Oral daily  ondansetron Injectable 4 milliGRAM(s) IV Push every 6 hours PRN  oxyCODONE    IR 5 milliGRAM(s) Oral every 6 hours PRN  sodium chloride 0.9%. 1000 milliLiter(s) IV Continuous <Continuous>      Vital Signs Last 24 Hrs  T(C): 36.9 (23 Jun 2021 04:47), Max: 36.9 (23 Jun 2021 04:47)  T(F): 98.4 (23 Jun 2021 04:47), Max: 98.4 (23 Jun 2021 04:47)  HR: 69 (23 Jun 2021 04:47) (64 - 102)  BP: 120/54 (23 Jun 2021 04:47) (111/56 - 140/70)  BP(mean): --  RR: 18 (23 Jun 2021 04:47) (12 - 18)  SpO2: 95% (23 Jun 2021 04:47) (95% - 100%)    I&O's Summary    22 Jun 2021 07:01  -  23 Jun 2021 07:00  --------------------------------------------------------  IN: 1344 mL / OUT: 1755 mL / NET: -411 mL        Physical Exam  Gen: NAD, comfortable in bed  Neuro: A&Ox3  Lungs: CTAB  CV: S1/S2, RRR  Abd: Soft, ND, + incisional tenderness, no rebound no guarding.  +GAY serosang     : ortega in place +yellow urine  Extremities: Venodynes in place. No LE edema bl                          12.3   17.63 )-----------( 195      ( 23 Jun 2021 05:45 )             37.9       06-23    139  |  108  |  23  ----------------------------<  114<H>  4.6   |  26  |  1.75<H>    Ca    8.1<L>      23 Jun 2021 05:45        A/P: 75y  Male POD1  s/p robotic radical nephroureterectomy who is recovering well but with increased wbc and crn  Tolerating diet  Pain controlled  DVT prophylaxis/OOB  Encourage Incentive spirometry  D/C GAY prior to discharge  D/W Dr Torres      75y Male with POD1  s/p robotic nephroureterectomy. Postop pt feeling great, states pain improved after pain medication. He reports that he sat on drain last night and contents spilled. He ate a last night, feels hungry today and is walking    Denies CP, palpitation, SOB, N/V/abdominal pain    amLODIPine   Tablet 2.5 milliGRAM(s) Oral daily  atorvastatin 20 milliGRAM(s) Oral at bedtime  cholecalciferol 1000 Unit(s) Oral daily  finasteride 5 milliGRAM(s) Oral daily  heparin   Injectable 5000 Unit(s) SubCutaneous every 12 hours  HYDROmorphone  Injectable 0.5 milliGRAM(s) IV Push every 4 hours PRN  levothyroxine 200 MICROGram(s) Oral daily  multivitamin 1 Tablet(s) Oral daily  ondansetron Injectable 4 milliGRAM(s) IV Push every 6 hours PRN  oxyCODONE    IR 5 milliGRAM(s) Oral every 6 hours PRN  sodium chloride 0.9%. 1000 milliLiter(s) IV Continuous <Continuous>      Vital Signs Last 24 Hrs  T(C): 36.9 (23 Jun 2021 04:47), Max: 36.9 (23 Jun 2021 04:47)  T(F): 98.4 (23 Jun 2021 04:47), Max: 98.4 (23 Jun 2021 04:47)  HR: 69 (23 Jun 2021 04:47) (64 - 102)  BP: 120/54 (23 Jun 2021 04:47) (111/56 - 140/70)  BP(mean): --  RR: 18 (23 Jun 2021 04:47) (12 - 18)  SpO2: 95% (23 Jun 2021 04:47) (95% - 100%)    I&O's Summary    22 Jun 2021 07:01  -  23 Jun 2021 07:00  --------------------------------------------------------  IN: 1344 mL / OUT: 1755 mL / NET: -411 mL        Physical Exam  Gen: NAD, comfortable in bed  Neuro: A&Ox3  Lungs: CTAB  CV: S1/S2, RRR  Abd: Soft, ND, + incisional tenderness, no rebound no guarding.  +GAY serosang     : ortega in place +yellow urine  Extremities: Venodynes in place. No LE edema bl                          12.3   17.63 )-----------( 195      ( 23 Jun 2021 05:45 )             37.9       06-23    139  |  108  |  23  ----------------------------<  114<H>  4.6   |  26  |  1.75<H>    Ca    8.1<L>      23 Jun 2021 05:45        A/P: 75y  Male POD1  s/p robotic radical nephroureterectomy who is recovering well but with increased wbc and crn  Tolerating diet  Pain controlled  DVT prophylaxis/OOB  Encourage Incentive spirometry  Cont IV hydration, PO intake. F/U wbc, crn this afternoon  D/C GAY prior to discharge  To go home with shannon  D/W Dr Torres

## 2021-06-24 ENCOUNTER — TRANSCRIPTION ENCOUNTER (OUTPATIENT)
Age: 76
End: 2021-06-24

## 2021-06-24 VITALS
SYSTOLIC BLOOD PRESSURE: 142 MMHG | HEART RATE: 84 BPM | OXYGEN SATURATION: 96 % | RESPIRATION RATE: 18 BRPM | TEMPERATURE: 98 F | DIASTOLIC BLOOD PRESSURE: 79 MMHG

## 2021-06-24 LAB
ANION GAP SERPL CALC-SCNC: 3 MMOL/L — LOW (ref 5–17)
BASOPHILS # BLD AUTO: 0.03 K/UL — SIGNIFICANT CHANGE UP (ref 0–0.2)
BASOPHILS NFR BLD AUTO: 0.3 % — SIGNIFICANT CHANGE UP (ref 0–2)
BUN SERPL-MCNC: 21 MG/DL — SIGNIFICANT CHANGE UP (ref 7–23)
CALCIUM SERPL-MCNC: 8.4 MG/DL — LOW (ref 8.5–10.1)
CHLORIDE SERPL-SCNC: 108 MMOL/L — SIGNIFICANT CHANGE UP (ref 96–108)
CO2 SERPL-SCNC: 28 MMOL/L — SIGNIFICANT CHANGE UP (ref 22–31)
CREAT SERPL-MCNC: 1.69 MG/DL — HIGH (ref 0.5–1.3)
EOSINOPHIL # BLD AUTO: 0.35 K/UL — SIGNIFICANT CHANGE UP (ref 0–0.5)
EOSINOPHIL NFR BLD AUTO: 3.5 % — SIGNIFICANT CHANGE UP (ref 0–6)
GLUCOSE SERPL-MCNC: 119 MG/DL — HIGH (ref 70–99)
HCT VFR BLD CALC: 39 % — SIGNIFICANT CHANGE UP (ref 39–50)
HGB BLD-MCNC: 12.8 G/DL — LOW (ref 13–17)
IMM GRANULOCYTES NFR BLD AUTO: 0.3 % — SIGNIFICANT CHANGE UP (ref 0–1.5)
LYMPHOCYTES # BLD AUTO: 1.18 K/UL — SIGNIFICANT CHANGE UP (ref 1–3.3)
LYMPHOCYTES # BLD AUTO: 11.7 % — LOW (ref 13–44)
MCHC RBC-ENTMCNC: 29.2 PG — SIGNIFICANT CHANGE UP (ref 27–34)
MCHC RBC-ENTMCNC: 32.8 GM/DL — SIGNIFICANT CHANGE UP (ref 32–36)
MCV RBC AUTO: 89 FL — SIGNIFICANT CHANGE UP (ref 80–100)
MONOCYTES # BLD AUTO: 1.12 K/UL — HIGH (ref 0–0.9)
MONOCYTES NFR BLD AUTO: 11.1 % — SIGNIFICANT CHANGE UP (ref 2–14)
NEUTROPHILS # BLD AUTO: 7.39 K/UL — SIGNIFICANT CHANGE UP (ref 1.8–7.4)
NEUTROPHILS NFR BLD AUTO: 73.1 % — SIGNIFICANT CHANGE UP (ref 43–77)
PLATELET # BLD AUTO: 179 K/UL — SIGNIFICANT CHANGE UP (ref 150–400)
POTASSIUM SERPL-MCNC: 4.4 MMOL/L — SIGNIFICANT CHANGE UP (ref 3.5–5.3)
POTASSIUM SERPL-SCNC: 4.4 MMOL/L — SIGNIFICANT CHANGE UP (ref 3.5–5.3)
RBC # BLD: 4.38 M/UL — SIGNIFICANT CHANGE UP (ref 4.2–5.8)
RBC # FLD: 13.2 % — SIGNIFICANT CHANGE UP (ref 10.3–14.5)
SODIUM SERPL-SCNC: 139 MMOL/L — SIGNIFICANT CHANGE UP (ref 135–145)
WBC # BLD: 10.1 K/UL — SIGNIFICANT CHANGE UP (ref 3.8–10.5)
WBC # FLD AUTO: 10.1 K/UL — SIGNIFICANT CHANGE UP (ref 3.8–10.5)

## 2021-06-24 RX ADMIN — HEPARIN SODIUM 5000 UNIT(S): 5000 INJECTION INTRAVENOUS; SUBCUTANEOUS at 06:04

## 2021-06-24 RX ADMIN — FINASTERIDE 5 MILLIGRAM(S): 5 TABLET, FILM COATED ORAL at 10:04

## 2021-06-24 RX ADMIN — AMLODIPINE BESYLATE 2.5 MILLIGRAM(S): 2.5 TABLET ORAL at 10:04

## 2021-06-24 RX ADMIN — OXYCODONE HYDROCHLORIDE 5 MILLIGRAM(S): 5 TABLET ORAL at 00:28

## 2021-06-24 RX ADMIN — OXYCODONE HYDROCHLORIDE 5 MILLIGRAM(S): 5 TABLET ORAL at 01:30

## 2021-06-24 RX ADMIN — OXYCODONE HYDROCHLORIDE 5 MILLIGRAM(S): 5 TABLET ORAL at 10:04

## 2021-06-24 RX ADMIN — Medication 200 MICROGRAM(S): at 06:04

## 2021-06-24 RX ADMIN — Medication 1 TABLET(S): at 10:04

## 2021-06-24 RX ADMIN — Medication 1000 UNIT(S): at 10:04

## 2021-06-24 NOTE — DISCHARGE NOTE PROVIDER - NSDCCPTREATMENT_GEN_ALL_CORE_FT
PRINCIPAL PROCEDURE  Procedure: Right nephroureterectomy  Findings and Treatment: robotic assisted, para-aortic lymph node dissection

## 2021-06-24 NOTE — DISCHARGE NOTE NURSING/CASE MANAGEMENT/SOCIAL WORK - PATIENT PORTAL LINK FT
You can access the FollowMyHealth Patient Portal offered by [facility] by registering at the following website: [email or web address]. By joining [facility]’s FollowMyHealth portal, you will also be able to view your health information using other applications (apps) compatible with our system.

## 2021-06-24 NOTE — DISCHARGE NOTE PROVIDER - HOSPITAL COURSE
Pt admitted s/p robotic right nephroureterectomy, Pt tolerated procedure well and was up ambulating the night of surgery.  Post-op course was mostly uneventful except for a slight bump in his creatnine level (2.0) which has now started to resolve (1.6). He is stable for d/c home to f/u with Dr. Torres in 1 week.

## 2021-06-24 NOTE — DISCHARGE NOTE PROVIDER - NSDCCPCAREPLAN_GEN_ALL_CORE_FT
PRINCIPAL DISCHARGE DIAGNOSIS  Diagnosis: Ureteral cancer, right  Assessment and Plan of Treatment:

## 2021-06-24 NOTE — DISCHARGE NOTE PROVIDER - NSDCMRMEDTOKEN_GEN_ALL_CORE_FT
amLODIPine 2.5 mg oral tablet: 1 tab(s) orally once a day -PM  aspirin 81 mg oral delayed release tablet: 1 tab(s) orally once a day-pt  said he stopped 4 days ago  atorvastatin 20 mg oral tablet: 1 tab(s) orally once a day  biotin 300 mcg oral tablet: 1 tab(s) orally once a day  finasteride 5 mg oral tablet: 1 tab(s) orally once a day-PM  folic acid 0.4 mg oral tablet: 1 tab(s) orally once a day  fosinopril 20 mg oral tablet:   levothyroxine 200 mcg (0.2 mg) oral tablet: 1 tab(s) orally once a day  Lutein:  with zeaxanthin     Multiple Vitamins oral tablet: 1 tab(s) orally once a day  oxycodone-acetaminophen 5 mg-325 mg oral tablet: 1 tab(s) orally every 6 hours MDD:4 tablets/day  vitamin b complex: once a day  Vitamin D3 1000 intl units (25 mcg) oral tablet: orally once a day

## 2021-06-24 NOTE — CHART NOTE - NSCHARTNOTEFT_GEN_A_CORE
Pt seen and examined at bedside POD#2 robotic right nephroureterectomy.  Pt without complaints and would like to go home today.    ICU Vital Signs Last 24 Hrs  T(C): 37 (23 Jun 2021 21:31), Max: 37 (23 Jun 2021 21:31)  T(F): 98.6 (23 Jun 2021 21:31), Max: 98.6 (23 Jun 2021 21:31)  HR: 82 (23 Jun 2021 21:31) (82 - 82)  BP: 143/76 (23 Jun 2021 21:31) (135/69 - 143/76)  BP(mean): --  ABP: --  ABP(mean): --  RR: 18 (23 Jun 2021 21:31) (18 - 18)  SpO2: 96% (23 Jun 2021 21:31) (96% - 99%)                          12.8   10.10 )-----------( 179      ( 24 Jun 2021 06:28 )             39.0     06-24    139  |  108  |  21  ----------------------------<  119<H>  4.4   |  28  |  1.69<H>    Ca    8.4<L>      24 Jun 2021 06:28    TPro  x   /  Alb  x   /  TBili  0.3  /  DBili  x   /  AST  x   /  ALT  x   /  AlkPhos  x   06-23 23 Jun 2021 07:01  -  24 Jun 2021 07:00  --------------------------------------------------------  IN:    sodium chloride 0.9%: 1000 mL  Total IN: 1000 mL    OUT:    Bulb (mL): 75 mL    Indwelling Catheter - Urethral (mL): 1200 mL    Voided (mL): 2775 mL  Total OUT: 4050 mL    Total NET: -3050 mL    Exam-  CV-RRR, S1, S2  Lungs- CTA b/L no wheezing  Abd- soft, non distended, +BS, non-tender, GAY in place and d/lupe, incision sites clean and dry    Plan-  GAY d/lupe, d/c home, f/u Dr. Torres in 1 week.    d/c Dr. Torres.

## 2021-06-24 NOTE — DISCHARGE NOTE PROVIDER - CARE PROVIDER_API CALL
Matthew Torres)  Urology  [address] [place] [address]  Phone: [phone]  Fax: [phone]  Established Patient  Follow Up Time: 1 week

## 2021-06-26 ENCOUNTER — RX RENEWAL (OUTPATIENT)
Age: 76
End: 2021-06-26

## 2021-06-30 DIAGNOSIS — C65.1 MALIGNANT NEOPLASM OF RIGHT RENAL PELVIS: ICD-10-CM

## 2021-06-30 DIAGNOSIS — Z85.51 PERSONAL HISTORY OF MALIGNANT NEOPLASM OF BLADDER: ICD-10-CM

## 2021-06-30 DIAGNOSIS — I10 ESSENTIAL (PRIMARY) HYPERTENSION: ICD-10-CM

## 2021-06-30 DIAGNOSIS — N40.0 BENIGN PROSTATIC HYPERPLASIA WITHOUT LOWER URINARY TRACT SYMPTOMS: ICD-10-CM

## 2021-06-30 DIAGNOSIS — D72.829 ELEVATED WHITE BLOOD CELL COUNT, UNSPECIFIED: ICD-10-CM

## 2021-06-30 DIAGNOSIS — Z87.891 PERSONAL HISTORY OF NICOTINE DEPENDENCE: ICD-10-CM

## 2021-06-30 DIAGNOSIS — E86.0 DEHYDRATION: ICD-10-CM

## 2021-06-30 DIAGNOSIS — E78.5 HYPERLIPIDEMIA, UNSPECIFIED: ICD-10-CM

## 2021-06-30 DIAGNOSIS — E03.9 HYPOTHYROIDISM, UNSPECIFIED: ICD-10-CM

## 2021-06-30 DIAGNOSIS — N17.9 ACUTE KIDNEY FAILURE, UNSPECIFIED: ICD-10-CM

## 2021-07-07 ENCOUNTER — APPOINTMENT (OUTPATIENT)
Dept: UROLOGY | Facility: CLINIC | Age: 76
End: 2021-07-07
Payer: MEDICARE

## 2021-07-07 VITALS
TEMPERATURE: 97.8 F | HEART RATE: 68 BPM | BODY MASS INDEX: 28.58 KG/M2 | WEIGHT: 211 LBS | OXYGEN SATURATION: 97 % | DIASTOLIC BLOOD PRESSURE: 78 MMHG | SYSTOLIC BLOOD PRESSURE: 162 MMHG | HEIGHT: 72 IN

## 2021-07-07 PROCEDURE — 99024 POSTOP FOLLOW-UP VISIT: CPT

## 2021-07-07 NOTE — HISTORY OF PRESENT ILLNESS
[None] : no symptoms [FreeTextEntry1] : 74 yo presents today for a POA 2 week s/p robotic radical nephroureterectomy on 6.22.2021.\par Pt. is recovering well since surgery. \par Here to discuss his final surgical pathology report. \par Pacheco catheter to be removed today.

## 2021-07-07 NOTE — ASSESSMENT
[FreeTextEntry1] : Pacheco catheter removed in office. \par Discussed the surgical pathology in detail with patient and his wife. \par Answered all questions and addressed all concerns. \par Recommended for patient to schedule a consultation appointment with  a medical oncologist, Dr. Richard Lester. \par Will present his case at the Genitourinary Tumor Board for further discussion.\par Tumor was visible but the extensions were microscopic into the perinephric fat. \par Surveillance protocol with CT scans/MRI and cystoscopies for further assessment discussed with patient. \par Advised patient to schedule a follow-up appointment in 2 weeks after consult w/ Dr. Lester. \par

## 2021-07-07 NOTE — PHYSICAL EXAM
[Normal Appearance] : normal appearance [Well Groomed] : well groomed [General Appearance - In No Acute Distress] : no acute distress [Abdomen Soft] : soft [Urethral Meatus] : meatus normal [Skin Color & Pigmentation] : normal skin color and pigmentation [Edema] : no peripheral edema [] : no respiratory distress [Oriented To Time, Place, And Person] : oriented to person, place, and time [Affect] : the affect was normal [Normal Station and Gait] : the gait and station were normal for the patient's age [No Focal Deficits] : no focal deficits [No Palpable Adenopathy] : no palpable adenopathy [FreeTextEntry1] : abdominal incisions healing well. No signs or symptoms of infection or drainage.

## 2021-07-13 ENCOUNTER — RESULT REVIEW (OUTPATIENT)
Age: 76
End: 2021-07-13

## 2021-07-20 ENCOUNTER — OUTPATIENT (OUTPATIENT)
Dept: OUTPATIENT SERVICES | Facility: HOSPITAL | Age: 76
LOS: 1 days | Discharge: HOME | End: 2021-07-20

## 2021-07-20 DIAGNOSIS — H33.20 SEROUS RETINAL DETACHMENT, UNSPECIFIED EYE: Chronic | ICD-10-CM

## 2021-07-20 DIAGNOSIS — Z98.89 OTHER SPECIFIED POSTPROCEDURAL STATES: Chronic | ICD-10-CM

## 2021-07-20 DIAGNOSIS — C64.9 MALIGNANT NEOPLASM OF UNSPECIFIED KIDNEY, EXCEPT RENAL PELVIS: ICD-10-CM

## 2021-07-20 DIAGNOSIS — Z98.890 OTHER SPECIFIED POSTPROCEDURAL STATES: Chronic | ICD-10-CM

## 2021-07-20 DIAGNOSIS — K40.90 UNILATERAL INGUINAL HERNIA, WITHOUT OBSTRUCTION OR GANGRENE, NOT SPECIFIED AS RECURRENT: Chronic | ICD-10-CM

## 2021-07-20 DIAGNOSIS — Z90.89 ACQUIRED ABSENCE OF OTHER ORGANS: Chronic | ICD-10-CM

## 2021-07-20 DIAGNOSIS — Z98.49 CATARACT EXTRACTION STATUS, UNSPECIFIED EYE: Chronic | ICD-10-CM

## 2021-07-21 ENCOUNTER — RESULT REVIEW (OUTPATIENT)
Age: 76
End: 2021-07-21

## 2021-07-21 ENCOUNTER — APPOINTMENT (OUTPATIENT)
Dept: HEMATOLOGY ONCOLOGY | Facility: CLINIC | Age: 76
End: 2021-07-21
Payer: MEDICARE

## 2021-07-21 VITALS
BODY MASS INDEX: 27.8 KG/M2 | TEMPERATURE: 98.3 F | WEIGHT: 205 LBS | SYSTOLIC BLOOD PRESSURE: 137 MMHG | DIASTOLIC BLOOD PRESSURE: 86 MMHG | HEART RATE: 86 BPM

## 2021-07-21 DIAGNOSIS — Z85.9 PERSONAL HISTORY OF MALIGNANT NEOPLASM, UNSPECIFIED: ICD-10-CM

## 2021-07-21 DIAGNOSIS — Z01.818 ENCOUNTER FOR OTHER PREPROCEDURAL EXAMINATION: ICD-10-CM

## 2021-07-21 DIAGNOSIS — R04.0 EPISTAXIS: ICD-10-CM

## 2021-07-21 DIAGNOSIS — H60.511 ACUTE ACTINIC OTITIS EXTERNA, RIGHT EAR: ICD-10-CM

## 2021-07-21 DIAGNOSIS — Z87.39 PERSONAL HISTORY OF OTHER DISEASES OF THE MUSCULOSKELETAL SYSTEM AND CONNECTIVE TISSUE: ICD-10-CM

## 2021-07-21 DIAGNOSIS — R82.90 UNSPECIFIED ABNORMAL FINDINGS IN URINE: ICD-10-CM

## 2021-07-21 LAB
ALBUMIN SERPL ELPH-MCNC: 4.2 G/DL — SIGNIFICANT CHANGE UP (ref 3.3–5)
ALP SERPL-CCNC: 141 U/L — HIGH (ref 40–120)
ALT FLD-CCNC: 22 U/L — SIGNIFICANT CHANGE UP (ref 10–45)
ANION GAP SERPL CALC-SCNC: 13 MMOL/L — SIGNIFICANT CHANGE UP (ref 5–17)
AST SERPL-CCNC: 18 U/L — SIGNIFICANT CHANGE UP (ref 10–40)
BASOPHILS # BLD AUTO: 0.04 K/UL — SIGNIFICANT CHANGE UP (ref 0–0.2)
BASOPHILS NFR BLD AUTO: 0.5 % — SIGNIFICANT CHANGE UP (ref 0–2)
BILIRUB SERPL-MCNC: 0.4 MG/DL — SIGNIFICANT CHANGE UP (ref 0.2–1.2)
BUN SERPL-MCNC: 30 MG/DL — HIGH (ref 7–23)
CALCIUM SERPL-MCNC: 9.5 MG/DL — SIGNIFICANT CHANGE UP (ref 8.4–10.5)
CHLORIDE SERPL-SCNC: 103 MMOL/L — SIGNIFICANT CHANGE UP (ref 96–108)
CO2 SERPL-SCNC: 22 MMOL/L — SIGNIFICANT CHANGE UP (ref 22–31)
CREAT SERPL-MCNC: 1.69 MG/DL — HIGH (ref 0.5–1.3)
EOSINOPHIL # BLD AUTO: 0.83 K/UL — HIGH (ref 0–0.5)
EOSINOPHIL NFR BLD AUTO: 10.4 % — HIGH (ref 0–6)
GLUCOSE SERPL-MCNC: 172 MG/DL — HIGH (ref 70–99)
HCT VFR BLD CALC: 42.5 % — SIGNIFICANT CHANGE UP (ref 39–50)
HGB BLD-MCNC: 13.9 G/DL — SIGNIFICANT CHANGE UP (ref 13–17)
IMM GRANULOCYTES NFR BLD AUTO: 0.4 % — SIGNIFICANT CHANGE UP (ref 0–1.5)
LYMPHOCYTES # BLD AUTO: 1.8 K/UL — SIGNIFICANT CHANGE UP (ref 1–3.3)
LYMPHOCYTES # BLD AUTO: 22.6 % — SIGNIFICANT CHANGE UP (ref 13–44)
MCHC RBC-ENTMCNC: 28.8 PG — SIGNIFICANT CHANGE UP (ref 27–34)
MCHC RBC-ENTMCNC: 32.7 GM/DL — SIGNIFICANT CHANGE UP (ref 32–36)
MCV RBC AUTO: 88.2 FL — SIGNIFICANT CHANGE UP (ref 80–100)
MONOCYTES # BLD AUTO: 0.73 K/UL — SIGNIFICANT CHANGE UP (ref 0–0.9)
MONOCYTES NFR BLD AUTO: 9.2 % — SIGNIFICANT CHANGE UP (ref 2–14)
NEUTROPHILS # BLD AUTO: 4.52 K/UL — SIGNIFICANT CHANGE UP (ref 1.8–7.4)
NEUTROPHILS NFR BLD AUTO: 56.9 % — SIGNIFICANT CHANGE UP (ref 43–77)
NRBC # BLD: 0 /100 WBCS — SIGNIFICANT CHANGE UP (ref 0–0)
PLATELET # BLD AUTO: 207 K/UL — SIGNIFICANT CHANGE UP (ref 150–400)
POTASSIUM SERPL-MCNC: 5.1 MMOL/L — SIGNIFICANT CHANGE UP (ref 3.5–5.3)
POTASSIUM SERPL-SCNC: 5.1 MMOL/L — SIGNIFICANT CHANGE UP (ref 3.5–5.3)
PROT SERPL-MCNC: 6.7 G/DL — SIGNIFICANT CHANGE UP (ref 6–8.3)
RBC # BLD: 4.82 M/UL — SIGNIFICANT CHANGE UP (ref 4.2–5.8)
RBC # FLD: 12.9 % — SIGNIFICANT CHANGE UP (ref 10.3–14.5)
SODIUM SERPL-SCNC: 138 MMOL/L — SIGNIFICANT CHANGE UP (ref 135–145)
WBC # BLD: 7.95 K/UL — SIGNIFICANT CHANGE UP (ref 3.8–10.5)
WBC # FLD AUTO: 7.95 K/UL — SIGNIFICANT CHANGE UP (ref 3.8–10.5)

## 2021-07-21 PROCEDURE — 99204 OFFICE O/P NEW MOD 45 MIN: CPT

## 2021-07-22 NOTE — REASON FOR VISIT
[Initial Consultation] : an initial consultation [FreeTextEntry2] : urothelial carcinoma of renal pelvis.

## 2021-07-22 NOTE — CDI QUERY NOTE - NSCDIOTHERTXTBX_GEN_ALL_CORE_HH
This patient with renal cancer and on BCG chemotherapy, which is nephrotoxic,   had an acute renal  failure and an elevated Crt       Creatinine, Serum: 1.69 mg/dL (06.24.21 @ 06:28)   Creatinine, Serum: 2.00 mg/dL (06.23.21 @ 11:24)   Creatinine, Serum: 1.75 mg/dL (06.23.21 @ 05:45)   Creatinine, Serum: 1.35 mg/dL (06.22.21 @ 13:03)    If there is a more specific diagnosis, please clarify in your discharge note:    A. JOSIANE with ATN  B. JOSIANE, no ATN  C. Other (please specify)  D. Not clinically significant

## 2021-07-22 NOTE — HISTORY OF PRESENT ILLNESS
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  He is undergone transurethral resection of bladder tumor and intravesicular BCG.  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 Miller.\par Follow-up cystoscopies were performed by Dr. Leggett for persistent abnormal urine cytology.\par In May 2021 he developed hematuria and CT of the abdomen and pelvis revealed a 1.4 cm enhancing lesion in the right renal upper pole major calyx compatible with neoplasm.\par On June 22, a robotic right nephro ureterectomy and retroperitoneal lymphadenectomy was performed by Dr. Torres.\par \par \par \par Final Diagnosis\par 1. Lymph nodes (paracaval):\par - Eight lymph nodes negative for malignancy (0/8).\par \par 2. RIGHT KIDNEY AND PROXIMAL URETER (RADICAL NEPHRECTOMY, 555 G):\par - UROTHELIAL CARCINOMA OF RENAL PELVIS, PAPILLARY AND INVASIVE,\par HIGH GRADE (1.5 CM)\par - NO DEFINITIVE LYMPHOVASCULAR INVASION.\par - TUMOR FOCALLY INVADES THROUGH PELVIC SMOOTH MUSCLE AND INTO\par FATTY TISSUE.\par - ALL MARGINS ARE FREE OF TUMOR (URETERAL, VASCULAR, AND ABEBA-\par RENAL ADIPOSE TISSUE)\par - Renal parenchyma showing mild nephrosclerosis.\par \par 3. Bladder cuff and distal ureter (right):\par - Negative for malignancy.\par - Patchy chronic inflammation and reactive urothelial changes.\par \par Stage T3N0M0.\par \par Medical oncology consultation was requested regarding the possibility of adjuvant chemotherapy.\par \par \par  [de-identified] : BUN 30, creatinine 1.69.

## 2021-07-22 NOTE — ASSESSMENT
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  It is felt that the majority of benefit accrues to patients who are able to receive cisplatin, which Romaine is not.\par \par We discussed the role of surveillance in this setting, and agreed to meet again in August to further discuss whether to proceed with adjuvant carboplatin/gemcitabine.

## 2021-07-22 NOTE — REVIEW OF SYSTEMS
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

## 2021-07-23 DIAGNOSIS — C65.1 MALIGNANT NEOPLASM OF RIGHT RENAL PELVIS: ICD-10-CM

## 2021-08-05 ENCOUNTER — NON-APPOINTMENT (OUTPATIENT)
Age: 76
End: 2021-08-05

## 2021-08-17 ENCOUNTER — APPOINTMENT (OUTPATIENT)
Dept: HEMATOLOGY ONCOLOGY | Facility: CLINIC | Age: 76
End: 2021-08-17

## 2021-09-15 ENCOUNTER — APPOINTMENT (OUTPATIENT)
Dept: UROLOGY | Facility: CLINIC | Age: 76
End: 2021-09-15
Payer: MEDICARE

## 2021-09-15 VITALS
SYSTOLIC BLOOD PRESSURE: 133 MMHG | OXYGEN SATURATION: 98 % | DIASTOLIC BLOOD PRESSURE: 77 MMHG | WEIGHT: 205 LBS | TEMPERATURE: 97.8 F | HEART RATE: 80 BPM | BODY MASS INDEX: 27.77 KG/M2 | HEIGHT: 72 IN

## 2021-09-15 PROCEDURE — 99024 POSTOP FOLLOW-UP VISIT: CPT

## 2021-09-15 NOTE — HISTORY OF PRESENT ILLNESS
[None] : no symptoms [FreeTextEntry1] : 74 yo presents today for a follow-up appointment s/p right nephroureterectomy 6.22.21. His tumor extended microscopically  into perinephric fat. Pt. was referred to Dr. Lester for a medical oncology consultation. Pt. is not a candidate for cisplatin due to creatinine clearance 39. It was recommended for patient to start gemcitabine and carboplatin treatment, Pt. has declined to pursue his chemotherapy at this time. \par Pt. is experiencing urinary frequency and nocturia.

## 2021-09-15 NOTE — ASSESSMENT
[FreeTextEntry1] : Discussed urinary frequency and nocturia with patient. \par Rx for tamsulosin sent to [facility].\par Recommended cystoscopy for evaluation. Pt. wishes to wait to have this done in December. \par CXR\par CT abdomen and pelvis\par Both ordered for December 2021.\par Next follow-up appointment in December 2021 (cystoscopy).\par Answered all questions and addressed all concerns.

## 2021-10-11 ENCOUNTER — LABORATORY RESULT (OUTPATIENT)
Age: 76
End: 2021-10-11

## 2021-10-12 ENCOUNTER — LABORATORY RESULT (OUTPATIENT)
Age: 76
End: 2021-10-12

## 2021-10-13 ENCOUNTER — APPOINTMENT (OUTPATIENT)
Dept: UROLOGY | Facility: CLINIC | Age: 76
End: 2021-10-13
Payer: MEDICARE

## 2021-10-13 VITALS
BODY MASS INDEX: 27.77 KG/M2 | SYSTOLIC BLOOD PRESSURE: 154 MMHG | OXYGEN SATURATION: 98 % | WEIGHT: 205 LBS | DIASTOLIC BLOOD PRESSURE: 79 MMHG | HEART RATE: 67 BPM | HEIGHT: 72 IN

## 2021-10-13 LAB
APPEARANCE: CLEAR
BACTERIA: NEGATIVE
BILIRUBIN URINE: NEGATIVE
BLOOD URINE: ABNORMAL
COLOR: ABNORMAL
GLUCOSE QUALITATIVE U: NEGATIVE
HYALINE CASTS: 0 /LPF
KETONES URINE: NEGATIVE
LEUKOCYTE ESTERASE URINE: ABNORMAL
MICROSCOPIC-UA: NORMAL
NITRITE URINE: NEGATIVE
PH URINE: 6
PROTEIN URINE: ABNORMAL
RED BLOOD CELLS URINE: 45 /HPF
SPECIFIC GRAVITY URINE: >=1.03
SQUAMOUS EPITHELIAL CELLS: 10 /HPF
UROBILINOGEN URINE: NORMAL
WHITE BLOOD CELLS URINE: >720 /HPF

## 2021-10-13 PROCEDURE — 99213 OFFICE O/P EST LOW 20 MIN: CPT

## 2021-10-15 NOTE — ASSESSMENT
[Urinary Tract Infection (599.0\N39.0)] : qualitative ~C was positive [FreeTextEntry1] : UTI on UA in office\par will send to lab for culture\par Cipro given

## 2021-10-15 NOTE — PHYSICAL EXAM
[General Appearance - Well Developed] : well developed [General Appearance - Well Nourished] : well nourished [Normal Appearance] : normal appearance [Well Groomed] : well groomed [General Appearance - In No Acute Distress] : no acute distress [Abdomen Soft] : soft [Abdomen Tenderness] : non-tender [Costovertebral Angle Tenderness] : no ~M costovertebral angle tenderness [Urethral Meatus] : meatus normal [Urinary Bladder Findings] : the bladder was normal on palpation [Testes Mass (___cm)] : there were no testicular masses [Scrotum] : the scrotum was normal [No Prostate Nodules] : no prostate nodules [Edema] : no peripheral edema [] : no respiratory distress [Exaggerated Use Of Accessory Muscles For Inspiration] : no accessory muscle use [Respiration, Rhythm And Depth] : normal respiratory rhythm and effort [Oriented To Time, Place, And Person] : oriented to person, place, and time [Affect] : the affect was normal [Mood] : the mood was normal [Not Anxious] : not anxious [Normal Station and Gait] : the gait and station were normal for the patient's age [No Focal Deficits] : no focal deficits [No Palpable Adenopathy] : no palpable adenopathy [FreeTextEntry1] : wounds healed

## 2021-10-18 LAB — BACTERIA UR CULT: ABNORMAL

## 2021-11-22 ENCOUNTER — LABORATORY RESULT (OUTPATIENT)
Age: 76
End: 2021-11-22

## 2021-11-22 LAB
APPEARANCE: CLEAR
BILIRUBIN URINE: NEGATIVE
BLOOD URINE: ABNORMAL
COLOR: NORMAL
GLUCOSE QUALITATIVE U: NEGATIVE
KETONES URINE: NEGATIVE
LEUKOCYTE ESTERASE URINE: ABNORMAL
NITRITE URINE: NEGATIVE
PH URINE: 5.5
PROTEIN URINE: NORMAL
PSA SERPL-MCNC: 1.56 NG/ML
SPECIFIC GRAVITY URINE: 1.02
UROBILINOGEN URINE: NORMAL

## 2021-11-26 DIAGNOSIS — N39.0 URINARY TRACT INFECTION, SITE NOT SPECIFIED: ICD-10-CM

## 2021-11-27 LAB — URINE CYTOLOGY: NORMAL

## 2021-12-15 ENCOUNTER — APPOINTMENT (OUTPATIENT)
Dept: UROLOGY | Facility: CLINIC | Age: 76
End: 2021-12-15

## 2021-12-15 ENCOUNTER — APPOINTMENT (OUTPATIENT)
Dept: UROLOGY | Facility: CLINIC | Age: 76
End: 2021-12-15
Payer: MEDICARE

## 2021-12-15 VITALS
HEART RATE: 84 BPM | SYSTOLIC BLOOD PRESSURE: 145 MMHG | OXYGEN SATURATION: 98 % | BODY MASS INDEX: 27.77 KG/M2 | DIASTOLIC BLOOD PRESSURE: 77 MMHG | HEIGHT: 72 IN | WEIGHT: 205 LBS

## 2021-12-15 PROCEDURE — 81003 URINALYSIS AUTO W/O SCOPE: CPT | Mod: QW

## 2021-12-15 PROCEDURE — 52000 CYSTOURETHROSCOPY: CPT

## 2021-12-17 LAB
BILIRUB UR QL STRIP: NEGATIVE
CLARITY UR: CLEAR
GLUCOSE UR-MCNC: NEGATIVE
HCG UR QL: 0.2 EU/DL
HGB UR QL STRIP.AUTO: NORMAL
KETONES UR-MCNC: NEGATIVE
LEUKOCYTE ESTERASE UR QL STRIP: NEGATIVE
NITRITE UR QL STRIP: NEGATIVE
PH UR STRIP: 5
PROT UR STRIP-MCNC: NORMAL
SP GR UR STRIP: 1.02

## 2022-06-08 ENCOUNTER — LABORATORY RESULT (OUTPATIENT)
Age: 77
End: 2022-06-08

## 2022-06-09 ENCOUNTER — LABORATORY RESULT (OUTPATIENT)
Age: 77
End: 2022-06-09

## 2022-06-16 ENCOUNTER — NON-APPOINTMENT (OUTPATIENT)
Age: 77
End: 2022-06-16

## 2022-06-22 ENCOUNTER — APPOINTMENT (OUTPATIENT)
Dept: UROLOGY | Facility: CLINIC | Age: 77
End: 2022-06-22
Payer: MEDICARE

## 2022-06-22 ENCOUNTER — APPOINTMENT (OUTPATIENT)
Dept: UROLOGY | Facility: CLINIC | Age: 77
End: 2022-06-22

## 2022-06-22 VITALS — OXYGEN SATURATION: 99 % | SYSTOLIC BLOOD PRESSURE: 163 MMHG | DIASTOLIC BLOOD PRESSURE: 84 MMHG | HEART RATE: 76 BPM

## 2022-06-22 LAB
BILIRUB UR QL STRIP: NEGATIVE
GLUCOSE UR-MCNC: NEGATIVE
HCG UR QL: 0.2 EU/DL
HGB UR QL STRIP.AUTO: NORMAL
KETONES UR-MCNC: NEGATIVE
LEUKOCYTE ESTERASE UR QL STRIP: NEGATIVE
NITRITE UR QL STRIP: NEGATIVE
PH UR STRIP: 5
PROT UR STRIP-MCNC: NORMAL
SP GR UR STRIP: 1.03

## 2022-06-22 PROCEDURE — 81003 URINALYSIS AUTO W/O SCOPE: CPT | Mod: QW

## 2022-11-03 DIAGNOSIS — H60.60 UNSPECIFIED CHRONIC OTITIS EXTERNA, UNSPECIFIED EAR: ICD-10-CM

## 2022-12-13 LAB — URINE CYTOLOGY: NORMAL

## 2022-12-16 DIAGNOSIS — R97.20 ELEVATED PROSTATE, SPECIFIC ANTIGEN [PSA]: ICD-10-CM

## 2022-12-22 LAB
PSA FREE FLD-MCNC: 9 %
PSA FREE SERPL-MCNC: 0.16 NG/ML
PSA SERPL-MCNC: 1.8 NG/ML

## 2023-01-04 ENCOUNTER — APPOINTMENT (OUTPATIENT)
Dept: UROLOGY | Facility: CLINIC | Age: 78
End: 2023-01-04
Payer: MEDICARE

## 2023-01-04 VITALS
WEIGHT: 210 LBS | HEIGHT: 72 IN | HEART RATE: 72 BPM | BODY MASS INDEX: 28.44 KG/M2 | SYSTOLIC BLOOD PRESSURE: 160 MMHG | OXYGEN SATURATION: 98 % | DIASTOLIC BLOOD PRESSURE: 78 MMHG

## 2023-01-04 PROCEDURE — 99213 OFFICE O/P EST LOW 20 MIN: CPT

## 2023-01-04 RX ORDER — TADALAFIL 20 MG/1
20 TABLET ORAL
Qty: 30 | Refills: 2 | Status: ACTIVE | COMMUNITY
Start: 2023-01-04 | End: 1900-01-01

## 2023-01-06 NOTE — HISTORY OF PRESENT ILLNESS
[FreeTextEntry1] : 76 yo presents today for a follow-up appointment for urothelial carcinoma. \par Pt. is doing well overall.\par No issues with urination or retention. Denies any gross hematuria.\par Recent CT abdomen and pelvis completed on 12.12.22.\par No evidence of recurrent or residual tumor and negative for metastatic disease.\par He had been taking Viagra 100 mg with minimal results. Would like to switch to Cialis. \par

## 2023-03-31 ENCOUNTER — OFFICE (OUTPATIENT)
Dept: URBAN - METROPOLITAN AREA CLINIC 102 | Facility: CLINIC | Age: 78
Setting detail: OPHTHALMOLOGY
End: 2023-03-31
Payer: MEDICARE

## 2023-03-31 ENCOUNTER — OFFICE (OUTPATIENT)
Dept: URBAN - METROPOLITAN AREA CLINIC 102 | Facility: CLINIC | Age: 78
Setting detail: OPHTHALMOLOGY
End: 2023-03-31

## 2023-03-31 DIAGNOSIS — H18.11: ICD-10-CM

## 2023-03-31 DIAGNOSIS — Z96.1: ICD-10-CM

## 2023-03-31 DIAGNOSIS — H21.232: ICD-10-CM

## 2023-03-31 DIAGNOSIS — H35.3211: ICD-10-CM

## 2023-03-31 DIAGNOSIS — H90.3: ICD-10-CM

## 2023-03-31 DIAGNOSIS — H33.8: ICD-10-CM

## 2023-03-31 PROCEDURE — 92134 CPTRZ OPH DX IMG PST SGM RTA: CPT | Performed by: OPHTHALMOLOGY

## 2023-03-31 PROCEDURE — 92551 PURE TONE HEARING TEST AIR: CPT | Performed by: AUDIOLOGIST

## 2023-03-31 PROCEDURE — 92004 COMPRE OPH EXAM NEW PT 1/>: CPT | Performed by: OPHTHALMOLOGY

## 2023-03-31 ASSESSMENT — SPHEQUIV_DERIVED
OD_SPHEQUIV: -0.125
OS_SPHEQUIV: 0.5
OS_SPHEQUIV: 0.5
OD_SPHEQUIV: -0.125

## 2023-03-31 ASSESSMENT — VISUAL ACUITY
OD_BCVA: 20/25+2
OS_BCVA: 20/80

## 2023-03-31 ASSESSMENT — CORNEAL EDEMA CLINICAL DESCRIPTION: OD_CORNEALEDEMA: 3+

## 2023-03-31 ASSESSMENT — REFRACTION_MANIFEST
OS_SPHERE: +1.00
OD_AXIS: 036
OS_AXIS: 097
OS_CYLINDER: -1.00
OD_VA1: 20/100
OD_SPHERE: +0.75
OD_CYLINDER: -1.75

## 2023-03-31 ASSESSMENT — REFRACTION_AUTOREFRACTION
OD_SPHERE: +0.75
OS_CYLINDER: -1.00
OD_CYLINDER: -1.75
OD_AXIS: 036
OS_SPHERE: +1.00
OS_AXIS: 097

## 2023-03-31 ASSESSMENT — CONFRONTATIONAL VISUAL FIELD TEST (CVF)
OD_FINDINGS: FULL
OS_FINDINGS: FULL

## 2023-03-31 ASSESSMENT — CORNEAL EDEMA - FOLDS/STRIAE: OD_FOLDSSTRIAE: 2+ 3+

## 2023-03-31 ASSESSMENT — TONOMETRY: OS_IOP_MMHG: 15

## 2023-04-06 ENCOUNTER — OFFICE (OUTPATIENT)
Dept: URBAN - METROPOLITAN AREA CLINIC 70 | Facility: CLINIC | Age: 78
Setting detail: OPHTHALMOLOGY
End: 2023-04-06
Payer: MEDICARE

## 2023-04-06 DIAGNOSIS — H18.231: ICD-10-CM

## 2023-04-06 PROBLEM — H33.8 RETINAL DETACHMENT, OTHER: Status: ACTIVE | Noted: 2023-03-31

## 2023-04-06 PROBLEM — H18.11 BULLOUS KERATOPATHY; RIGHT EYE: Status: ACTIVE | Noted: 2023-03-31

## 2023-04-06 PROCEDURE — 92025 CPTRIZED CORNEAL TOPOGRAPHY: CPT | Performed by: OPHTHALMOLOGY

## 2023-04-06 PROCEDURE — 92286 ANT SGM IMG I&R SPECLR MIC: CPT | Performed by: OPHTHALMOLOGY

## 2023-04-06 PROCEDURE — 99213 OFFICE O/P EST LOW 20 MIN: CPT | Performed by: OPHTHALMOLOGY

## 2023-04-06 ASSESSMENT — KERATOMETRY
OS_AXISANGLE_DEGREES: 014
METHOD_AUTO_MANUAL: AUTO
OD_K1POWER_DIOPTERS: 39.50
OS_K2POWER_DIOPTERS: 40.50
OS_K1POWER_DIOPTERS: 40.25
OD_AXISANGLE_DEGREES: 100
OD_K2POWER_DIOPTERS: 41.00

## 2023-04-06 ASSESSMENT — REFRACTION_MANIFEST
OD_VA1: 20/100
OS_SPHERE: +1.00
OS_CYLINDER: -1.00
OD_SPHERE: +0.75
OD_CYLINDER: -1.75
OD_AXIS: 036
OS_AXIS: 097

## 2023-04-06 ASSESSMENT — CORNEAL EDEMA - FOLDS/STRIAE: OD_FOLDSSTRIAE: 2+ 3+

## 2023-04-06 ASSESSMENT — REFRACTION_AUTOREFRACTION
OD_SPHERE: +0.25
OD_CYLINDER: -2.50
OS_CYLINDER: -1.75
OS_AXIS: 088
OS_SPHERE: +1.75
OD_AXIS: 019

## 2023-04-06 ASSESSMENT — VISUAL ACUITY
OS_BCVA: 20/80
OD_BCVA: 20/25+2

## 2023-04-06 ASSESSMENT — CONFRONTATIONAL VISUAL FIELD TEST (CVF)
OD_FINDINGS: FULL
OS_FINDINGS: FULL

## 2023-04-06 ASSESSMENT — SPHEQUIV_DERIVED
OD_SPHEQUIV: -1
OS_SPHEQUIV: 0.875
OS_SPHEQUIV: 0.5
OD_SPHEQUIV: -0.125

## 2023-04-06 ASSESSMENT — AXIALLENGTH_DERIVED
OD_AL: 25.2892
OD_AL: 24.9034
OS_AL: 24.5851
OS_AL: 24.4274

## 2023-04-06 ASSESSMENT — CORNEAL EDEMA CLINICAL DESCRIPTION: OD_CORNEALEDEMA: 3+

## 2023-05-02 ENCOUNTER — OFFICE (OUTPATIENT)
Dept: URBAN - METROPOLITAN AREA CLINIC 70 | Facility: CLINIC | Age: 78
Setting detail: OPHTHALMOLOGY
End: 2023-05-02
Payer: MEDICARE

## 2023-05-02 DIAGNOSIS — H18.231: ICD-10-CM

## 2023-05-02 PROCEDURE — 99213 OFFICE O/P EST LOW 20 MIN: CPT | Performed by: OPHTHALMOLOGY

## 2023-05-02 PROCEDURE — 92025 CPTRIZED CORNEAL TOPOGRAPHY: CPT | Performed by: OPHTHALMOLOGY

## 2023-05-02 ASSESSMENT — REFRACTION_MANIFEST
OS_SPHERE: +1.00
OD_CYLINDER: -1.75
OS_AXIS: 097
OD_AXIS: 036
OD_SPHERE: +0.75
OD_VA1: 20/100
OS_CYLINDER: -1.00

## 2023-05-02 ASSESSMENT — AXIALLENGTH_DERIVED
OD_AL: 25.43
OS_AL: 24.5353
OS_AL: 24.3264
OD_AL: 25.26

## 2023-05-02 ASSESSMENT — VISUAL ACUITY
OS_BCVA: 20/80
OD_BCVA: 20/25+2

## 2023-05-02 ASSESSMENT — CORNEAL EDEMA - FOLDS/STRIAE: OD_FOLDSSTRIAE: 2+ 3+

## 2023-05-02 ASSESSMENT — CONFRONTATIONAL VISUAL FIELD TEST (CVF)
OD_FINDINGS: FULL
OS_FINDINGS: FULL

## 2023-05-02 ASSESSMENT — SPHEQUIV_DERIVED
OS_SPHEQUIV: 1
OD_SPHEQUIV: -0.5
OS_SPHEQUIV: 0.5
OD_SPHEQUIV: -0.125

## 2023-05-02 ASSESSMENT — KERATOMETRY
OS_K2POWER_DIOPTERS: 40.75
OD_K2POWER_DIOPTERS: 40.25
OD_K1POWER_DIOPTERS: 38.52
OD_AXISANGLE_DEGREES: 099
OS_K1POWER_DIOPTERS: 40.25
METHOD_AUTO_MANUAL: AUTO
OS_AXISANGLE_DEGREES: 027

## 2023-05-02 ASSESSMENT — REFRACTION_AUTOREFRACTION
OD_AXIS: 038
OD_CYLINDER: -3.50
OS_SPHERE: +1.75
OS_CYLINDER: -1.50
OD_SPHERE: +1.25
OS_AXIS: 097

## 2023-05-02 ASSESSMENT — CORNEAL EDEMA CLINICAL DESCRIPTION: OD_CORNEALEDEMA: 3+

## 2023-05-17 ENCOUNTER — APPOINTMENT (OUTPATIENT)
Dept: GASTROENTEROLOGY | Facility: CLINIC | Age: 78
End: 2023-05-17
Payer: MEDICARE

## 2023-05-17 VITALS
HEART RATE: 60 BPM | BODY MASS INDEX: 29.26 KG/M2 | DIASTOLIC BLOOD PRESSURE: 86 MMHG | HEIGHT: 72 IN | WEIGHT: 216 LBS | SYSTOLIC BLOOD PRESSURE: 144 MMHG

## 2023-05-17 DIAGNOSIS — Z12.11 ENCOUNTER FOR SCREENING FOR MALIGNANT NEOPLASM OF COLON: ICD-10-CM

## 2023-05-17 DIAGNOSIS — Z80.0 ENCOUNTER FOR SCREENING FOR MALIGNANT NEOPLASM OF COLON: ICD-10-CM

## 2023-05-17 PROCEDURE — 99204 OFFICE O/P NEW MOD 45 MIN: CPT

## 2023-05-17 RX ORDER — PANTOPRAZOLE 40 MG/1
40 TABLET, DELAYED RELEASE ORAL DAILY
Qty: 90 | Refills: 3 | Status: ACTIVE | COMMUNITY
Start: 2023-05-17 | End: 1900-01-01

## 2023-05-17 NOTE — ASSESSMENT
[FreeTextEntry1] : 78 yo male with history of epigastric discomfort, and family history of colon cancer.  Empiric trial of pantoprazole 40 mg daily.  Arrange for colonoscopy and upper endoscopy.

## 2023-05-17 NOTE — HISTORY OF PRESENT ILLNESS
[FreeTextEntry1] : Mr. EMORY AMEZCUA is a 77 year old male with history of epigastric discomfort.  Patient reports epigastric discomfort that gets better after eating.  Symptoms are occasionally nocturnal.  There has been no nausea vomiting hematemesis or weight loss.  Patient does take a daily aspirin.  There is been no bowel issues.  Patient had last colonoscopy in 2019.  Patient has a family history of colon cancer.  Patient is status post bladder resection and nephrectomy.\par

## 2023-06-06 ENCOUNTER — OFFICE (OUTPATIENT)
Dept: URBAN - METROPOLITAN AREA CLINIC 70 | Facility: CLINIC | Age: 78
Setting detail: OPHTHALMOLOGY
End: 2023-06-06
Payer: MEDICARE

## 2023-06-06 ENCOUNTER — RX ONLY (RX ONLY)
Age: 78
End: 2023-06-06

## 2023-06-06 DIAGNOSIS — H18.231: ICD-10-CM

## 2023-06-06 PROCEDURE — 99213 OFFICE O/P EST LOW 20 MIN: CPT | Performed by: OPHTHALMOLOGY

## 2023-06-06 ASSESSMENT — KERATOMETRY
METHOD_AUTO_MANUAL: AUTO
OS_AXISANGLE_DEGREES: 022
OS_K1POWER_DIOPTERS: 40.25
OD_AXISANGLE_DEGREES: 110
OS_K2POWER_DIOPTERS: 40.75
OD_K2POWER_DIOPTERS: 38.75
OD_K1POWER_DIOPTERS: 37.00

## 2023-06-06 ASSESSMENT — CORNEAL EDEMA - FOLDS/STRIAE: OD_FOLDSSTRIAE: 2+ 3+

## 2023-06-06 ASSESSMENT — REFRACTION_MANIFEST
OD_VA1: 20/100
OD_SPHERE: +0.75
OS_SPHERE: +1.00
OS_AXIS: 097
OD_AXIS: 036
OS_CYLINDER: -1.00
OD_CYLINDER: -1.75

## 2023-06-06 ASSESSMENT — AXIALLENGTH_DERIVED
OD_AL: 25.915
OS_AL: 24.4827
OS_AL: 24.5353
OD_AL: 26.3329

## 2023-06-06 ASSESSMENT — CONFRONTATIONAL VISUAL FIELD TEST (CVF)
OS_FINDINGS: FULL
OD_FINDINGS: FULL

## 2023-06-06 ASSESSMENT — VISUAL ACUITY
OS_BCVA: 20/>200
OD_BCVA: 20/30

## 2023-06-06 ASSESSMENT — REFRACTION_AUTOREFRACTION
OS_AXIS: 097
OS_CYLINDER: -1.25
OD_AXIS: 065
OD_SPHERE: +0.25
OD_CYLINDER: -2.50
OS_SPHERE: +1.25

## 2023-06-06 ASSESSMENT — SPHEQUIV_DERIVED
OS_SPHEQUIV: 0.625
OD_SPHEQUIV: -0.125
OS_SPHEQUIV: 0.5
OD_SPHEQUIV: -1

## 2023-06-06 ASSESSMENT — CORNEAL EDEMA CLINICAL DESCRIPTION: OD_CORNEALEDEMA: 3+

## 2023-06-28 ENCOUNTER — OFFICE (OUTPATIENT)
Dept: URBAN - METROPOLITAN AREA CLINIC 70 | Facility: CLINIC | Age: 78
Setting detail: OPHTHALMOLOGY
End: 2023-06-28
Payer: MEDICARE

## 2023-06-28 DIAGNOSIS — H18.231: ICD-10-CM

## 2023-06-28 PROCEDURE — 99213 OFFICE O/P EST LOW 20 MIN: CPT | Performed by: OPHTHALMOLOGY

## 2023-06-28 ASSESSMENT — CORNEAL EDEMA - FOLDS/STRIAE: OD_FOLDSSTRIAE: 2+ 3+

## 2023-06-28 ASSESSMENT — KERATOMETRY
OD_AXISANGLE_DEGREES: 110
OD_K2POWER_DIOPTERS: 38.75
OS_AXISANGLE_DEGREES: 021
OD_K1POWER_DIOPTERS: 37.00
OS_K1POWER_DIOPTERS: 40.25
OS_K2POWER_DIOPTERS: 40.75
METHOD_AUTO_MANUAL: AUTO

## 2023-06-28 ASSESSMENT — REFRACTION_MANIFEST
OD_CYLINDER: -1.75
OS_AXIS: 097
OS_SPHERE: +1.00
OD_VA1: 20/100
OD_SPHERE: +0.75
OD_AXIS: 036
OS_CYLINDER: -1.00

## 2023-06-28 ASSESSMENT — VISUAL ACUITY
OD_BCVA: 20/30
OS_BCVA: 20/>200

## 2023-06-28 ASSESSMENT — REFRACTION_AUTOREFRACTION
OD_SPHERE: +0.25
OS_AXIS: 094
OD_AXIS: 065
OS_CYLINDER: -1.50
OD_CYLINDER: -2.50
OS_SPHERE: +1.75

## 2023-06-28 ASSESSMENT — SPHEQUIV_DERIVED
OS_SPHEQUIV: 0.5
OD_SPHEQUIV: -1
OS_SPHEQUIV: 1
OD_SPHEQUIV: -0.125

## 2023-06-28 ASSESSMENT — CONFRONTATIONAL VISUAL FIELD TEST (CVF)
OS_FINDINGS: FULL
OD_FINDINGS: FULL

## 2023-06-28 ASSESSMENT — AXIALLENGTH_DERIVED
OS_AL: 24.5353
OD_AL: 25.915
OS_AL: 24.3264
OD_AL: 26.3329

## 2023-06-28 ASSESSMENT — CORNEAL EDEMA CLINICAL DESCRIPTION: OD_CORNEALEDEMA: 3+

## 2023-07-05 ENCOUNTER — APPOINTMENT (OUTPATIENT)
Dept: OTOLARYNGOLOGY | Facility: CLINIC | Age: 78
End: 2023-07-05
Payer: MEDICARE

## 2023-07-05 VITALS — HEIGHT: 72 IN | TEMPERATURE: 97.3 F | BODY MASS INDEX: 28.71 KG/M2 | WEIGHT: 212 LBS

## 2023-07-05 DIAGNOSIS — H90.3 SENSORINEURAL HEARING LOSS, BILATERAL: ICD-10-CM

## 2023-07-05 DIAGNOSIS — H60.311 DIFFUSE OTITIS EXTERNA, RIGHT EAR: ICD-10-CM

## 2023-07-05 DIAGNOSIS — H93.291 OTHER ABNORMAL AUDITORY PERCEPTIONS, RIGHT EAR: ICD-10-CM

## 2023-07-05 DIAGNOSIS — H61.23 IMPACTED CERUMEN, BILATERAL: ICD-10-CM

## 2023-07-05 PROCEDURE — 99203 OFFICE O/P NEW LOW 30 MIN: CPT | Mod: 25

## 2023-07-05 PROCEDURE — 69210 REMOVE IMPACTED EAR WAX UNI: CPT

## 2023-07-05 NOTE — ASSESSMENT
[FreeTextEntry1] : cerumen cleared\par ad ot externa\par chronic ot externa\par acetic acid gtts\par  30 minutes spent with patient with exam and counseling excluding time for any procedure.\par fu 6 mo

## 2023-07-05 NOTE — PROCEDURE
[Cerumen Impaction] : Cerumen Impaction [FreeTextEntry6] : Indication: ear plugging and discomfort\par Large amount cerumen cleared left and right ear instrumentation with curettes, forceps and suction.\par Ear canals and tympanic membranes  unremarkable.\par au canal red

## 2023-07-07 DIAGNOSIS — R94.4 ABNORMAL RESULTS OF KIDNEY FUNCTION STUDIES: ICD-10-CM

## 2023-07-12 ENCOUNTER — APPOINTMENT (OUTPATIENT)
Dept: UROLOGY | Facility: CLINIC | Age: 78
End: 2023-07-12
Payer: MEDICARE

## 2023-07-12 DIAGNOSIS — C65.1 MALIGNANT NEOPLASM OF RIGHT RENAL PELVIS: ICD-10-CM

## 2023-07-12 LAB
BILIRUB UR QL STRIP: NORMAL
GLUCOSE UR-MCNC: NORMAL
HCG UR QL: 0.2 EU/DL
HGB UR QL STRIP.AUTO: NORMAL
KETONES UR-MCNC: NORMAL
LEUKOCYTE ESTERASE UR QL STRIP: NORMAL
NITRITE UR QL STRIP: NORMAL
PH UR STRIP: 5
PROT UR STRIP-MCNC: NORMAL
SP GR UR STRIP: 1.02

## 2023-07-12 PROCEDURE — 81003 URINALYSIS AUTO W/O SCOPE: CPT | Mod: QW

## 2023-07-12 PROCEDURE — 52000 CYSTOURETHROSCOPY: CPT

## 2023-07-18 LAB
ANION GAP SERPL CALC-SCNC: 8 MMOL/L
BUN SERPL-MCNC: 26 MG/DL
CALCIUM SERPL-MCNC: 9.2 MG/DL
CHLORIDE SERPL-SCNC: 106 MMOL/L
CO2 SERPL-SCNC: 26 MMOL/L
CREAT SERPL-MCNC: 1.68 MG/DL
EGFR: 41 ML/MIN/1.73M2
GLUCOSE SERPL-MCNC: 99 MG/DL
HCT VFR BLD CALC: 41.9 %
HGB BLD-MCNC: 13.3 G/DL
MCHC RBC-ENTMCNC: 29.3 PG
MCHC RBC-ENTMCNC: 31.7 GM/DL
MCV RBC AUTO: 92.3 FL
PLATELET # BLD AUTO: 187 K/UL
POTASSIUM SERPL-SCNC: 4.8 MMOL/L
RBC # BLD: 4.54 M/UL
RBC # FLD: 13.3 %
SODIUM SERPL-SCNC: 141 MMOL/L
WBC # FLD AUTO: 6.32 K/UL

## 2023-07-19 DIAGNOSIS — N40.1 BENIGN PROSTATIC HYPERPLASIA WITH LOWER URINARY TRACT SYMPMS: ICD-10-CM

## 2023-07-19 DIAGNOSIS — N13.8 BENIGN PROSTATIC HYPERPLASIA WITH LOWER URINARY TRACT SYMPMS: ICD-10-CM

## 2023-07-28 ENCOUNTER — APPOINTMENT (OUTPATIENT)
Dept: GASTROENTEROLOGY | Facility: AMBULATORY MEDICAL SERVICES | Age: 78
End: 2023-07-28
Payer: MEDICARE

## 2023-07-28 ENCOUNTER — RESULT REVIEW (OUTPATIENT)
Age: 78
End: 2023-07-28

## 2023-07-28 PROCEDURE — 43239 EGD BIOPSY SINGLE/MULTIPLE: CPT | Mod: GC,59

## 2023-07-28 PROCEDURE — 45378 DIAGNOSTIC COLONOSCOPY: CPT | Mod: GC

## 2023-07-30 LAB — URINE CYTOLOGY: NORMAL

## 2023-08-15 ENCOUNTER — RX ONLY (RX ONLY)
Age: 78
End: 2023-08-15

## 2023-08-15 ENCOUNTER — OFFICE (OUTPATIENT)
Dept: URBAN - METROPOLITAN AREA CLINIC 70 | Facility: CLINIC | Age: 78
Setting detail: OPHTHALMOLOGY
End: 2023-08-15
Payer: MEDICARE

## 2023-08-15 DIAGNOSIS — H18.231: ICD-10-CM

## 2023-08-15 PROCEDURE — 99213 OFFICE O/P EST LOW 20 MIN: CPT | Performed by: OPHTHALMOLOGY

## 2023-08-15 ASSESSMENT — KERATOMETRY
OD_K2POWER_DIOPTERS: 38.75
OD_K1POWER_DIOPTERS: 37.00
OS_AXISANGLE_DEGREES: 021
OD_AXISANGLE_DEGREES: 110
METHOD_AUTO_MANUAL: AUTO
OS_K1POWER_DIOPTERS: 40.25
OS_K2POWER_DIOPTERS: 40.75

## 2023-08-15 ASSESSMENT — VISUAL ACUITY
OD_BCVA: 20/30
OS_BCVA: 20/80-2

## 2023-08-15 ASSESSMENT — SPHEQUIV_DERIVED
OS_SPHEQUIV: 1
OD_SPHEQUIV: -0.125
OD_SPHEQUIV: -1
OS_SPHEQUIV: 0.5

## 2023-08-15 ASSESSMENT — AXIALLENGTH_DERIVED
OD_AL: 26.3329
OS_AL: 24.3264
OD_AL: 25.915
OS_AL: 24.5353

## 2023-08-15 ASSESSMENT — CONFRONTATIONAL VISUAL FIELD TEST (CVF)
OD_FINDINGS: FULL
OS_FINDINGS: FULL

## 2023-08-15 ASSESSMENT — REFRACTION_AUTOREFRACTION
OS_CYLINDER: -1.50
OS_SPHERE: +1.75
OD_SPHERE: +0.25
OD_AXIS: 065
OD_CYLINDER: -2.50
OS_AXIS: 094

## 2023-08-15 ASSESSMENT — REFRACTION_MANIFEST
OS_SPHERE: +1.00
OD_CYLINDER: -1.75
OS_AXIS: 097
OD_VA1: 20/100
OS_CYLINDER: -1.00
OD_SPHERE: +0.75
OD_AXIS: 036

## 2023-08-15 ASSESSMENT — CORNEAL EDEMA CLINICAL DESCRIPTION: OD_CORNEALEDEMA: 3+

## 2023-08-15 ASSESSMENT — CORNEAL EDEMA - FOLDS/STRIAE: OD_FOLDSSTRIAE: 2+ 3+

## 2023-09-01 ENCOUNTER — RX ONLY (RX ONLY)
Age: 78
End: 2023-09-01

## 2023-09-01 ENCOUNTER — AMBUL SURGICAL CARE (OUTPATIENT)
Dept: URBAN - METROPOLITAN AREA CLINIC 18 | Facility: CLINIC | Age: 78
Setting detail: OPHTHALMOLOGY
End: 2023-09-01
Payer: MEDICARE

## 2023-09-01 DIAGNOSIS — H18.231: ICD-10-CM

## 2023-09-01 PROCEDURE — 65756 CORNEAL TRNSPL ENDOTHELIAL: CPT | Performed by: OPHTHALMOLOGY

## 2023-09-01 PROCEDURE — 65757 PREP CORNEAL ENDO ALLOGRAFT: CPT | Performed by: OPHTHALMOLOGY

## 2023-09-02 ENCOUNTER — OFFICE (OUTPATIENT)
Dept: URBAN - METROPOLITAN AREA CLINIC 104 | Facility: CLINIC | Age: 78
Setting detail: OPHTHALMOLOGY
End: 2023-09-02
Payer: MEDICARE

## 2023-09-02 DIAGNOSIS — H18.11: ICD-10-CM

## 2023-09-02 DIAGNOSIS — H18.231: ICD-10-CM

## 2023-09-02 PROCEDURE — 99024 POSTOP FOLLOW-UP VISIT: CPT | Performed by: OPHTHALMOLOGY

## 2023-09-02 ASSESSMENT — KERATOMETRY
OS_K1POWER_DIOPTERS: 39.34
METHOD_AUTO_MANUAL: AUTO
OD_K1POWER_DIOPTERS: UNABLE
OS_AXISANGLE_DEGREES: 006
OS_K2POWER_DIOPTERS: 40.37

## 2023-09-02 ASSESSMENT — AXIALLENGTH_DERIVED
OS_AL: 24.5809
OS_AL: 24.79

## 2023-09-02 ASSESSMENT — REFRACTION_MANIFEST
OS_CYLINDER: -1.00
OD_CYLINDER: -1.75
OS_SPHERE: +1.00
OD_SPHERE: +0.75
OD_AXIS: 036
OD_VA1: 20/100
OS_AXIS: 097

## 2023-09-02 ASSESSMENT — REFRACTION_AUTOREFRACTION
OS_SPHERE: +1.75
OS_AXIS: 089
OS_CYLINDER: -1.50

## 2023-09-02 ASSESSMENT — CORNEAL EDEMA - FOLDS/STRIAE: OD_FOLDSSTRIAE: 2+ 3+

## 2023-09-02 ASSESSMENT — TONOMETRY
OS_IOP_MMHG: 04
OD_IOP_MMHG: 03

## 2023-09-02 ASSESSMENT — SPHEQUIV_DERIVED
OS_SPHEQUIV: 0.5
OD_SPHEQUIV: -0.125
OS_SPHEQUIV: 1

## 2023-09-02 ASSESSMENT — CONFRONTATIONAL VISUAL FIELD TEST (CVF)
OD_FINDINGS: FULL
OS_FINDINGS: FULL

## 2023-09-02 ASSESSMENT — VISUAL ACUITY
OD_BCVA: 20/30
OS_BCVA: CF 4FT

## 2023-09-02 ASSESSMENT — CORNEAL EDEMA CLINICAL DESCRIPTION: OD_CORNEALEDEMA: 3+

## 2023-09-05 ENCOUNTER — OFFICE (OUTPATIENT)
Dept: URBAN - METROPOLITAN AREA CLINIC 70 | Facility: CLINIC | Age: 78
Setting detail: OPHTHALMOLOGY
End: 2023-09-05
Payer: MEDICARE

## 2023-09-05 DIAGNOSIS — H18.231: ICD-10-CM

## 2023-09-05 PROCEDURE — 99024 POSTOP FOLLOW-UP VISIT: CPT | Performed by: OPHTHALMOLOGY

## 2023-09-05 ASSESSMENT — VISUAL ACUITY
OS_BCVA: 20/250+1
OD_BCVA: 20/30

## 2023-09-05 ASSESSMENT — REFRACTION_MANIFEST
OS_AXIS: 097
OD_AXIS: 036
OS_SPHERE: +1.00
OS_CYLINDER: -1.00
OD_VA1: 20/100
OD_SPHERE: +0.75
OD_CYLINDER: -1.75

## 2023-09-05 ASSESSMENT — REFRACTION_AUTOREFRACTION
OS_SPHERE: +1.75
OS_AXIS: 089
OS_CYLINDER: -1.50

## 2023-09-05 ASSESSMENT — KERATOMETRY
METHOD_AUTO_MANUAL: AUTO
OS_K1POWER_DIOPTERS: 39.34
OS_AXISANGLE_DEGREES: 006
OD_K1POWER_DIOPTERS: UNABLE
OS_K2POWER_DIOPTERS: 40.37

## 2023-09-05 ASSESSMENT — AXIALLENGTH_DERIVED
OS_AL: 24.79
OS_AL: 24.5809

## 2023-09-05 ASSESSMENT — CORNEAL EDEMA - FOLDS/STRIAE: OD_FOLDSSTRIAE: 2+ 3+

## 2023-09-05 ASSESSMENT — SPHEQUIV_DERIVED
OD_SPHEQUIV: -0.125
OS_SPHEQUIV: 1
OS_SPHEQUIV: 0.5

## 2023-09-05 ASSESSMENT — CONFRONTATIONAL VISUAL FIELD TEST (CVF)
OS_FINDINGS: FULL
OD_FINDINGS: FULL

## 2023-09-05 ASSESSMENT — CORNEAL EDEMA CLINICAL DESCRIPTION: OD_CORNEALEDEMA: 3+

## 2023-09-13 ENCOUNTER — OFFICE (OUTPATIENT)
Dept: URBAN - METROPOLITAN AREA CLINIC 70 | Facility: CLINIC | Age: 78
Setting detail: OPHTHALMOLOGY
End: 2023-09-13
Payer: MEDICARE

## 2023-09-13 DIAGNOSIS — H18.231: ICD-10-CM

## 2023-09-13 DIAGNOSIS — H18.11: ICD-10-CM

## 2023-09-13 PROCEDURE — 99024 POSTOP FOLLOW-UP VISIT: CPT | Performed by: OPHTHALMOLOGY

## 2023-09-13 ASSESSMENT — REFRACTION_MANIFEST
OS_SPHERE: +1.00
OS_AXIS: 097
OS_CYLINDER: -1.00
OD_AXIS: 036
OD_CYLINDER: -1.75
OD_SPHERE: +0.75
OD_VA1: 20/100

## 2023-09-13 ASSESSMENT — CONFRONTATIONAL VISUAL FIELD TEST (CVF)
OD_FINDINGS: FULL
OS_FINDINGS: FULL

## 2023-09-13 ASSESSMENT — KERATOMETRY
OS_K1POWER_DIOPTERS: 40.25
OS_K2POWER_DIOPTERS: 40.50
OS_AXISANGLE_DEGREES: 021
METHOD_AUTO_MANUAL: AUTO
OD_K1POWER_DIOPTERS: UNABLE

## 2023-09-13 ASSESSMENT — SPHEQUIV_DERIVED
OD_SPHEQUIV: -0.125
OS_SPHEQUIV: 1.125
OS_SPHEQUIV: 0.5

## 2023-09-13 ASSESSMENT — VISUAL ACUITY
OD_BCVA: 20/30
OS_BCVA: 20/250+1

## 2023-09-13 ASSESSMENT — REFRACTION_AUTOREFRACTION
OS_SPHERE: +2.00
OS_CYLINDER: -1.75
OD_SPHERE: UNABLE
OS_AXIS: 091

## 2023-09-13 ASSESSMENT — CORNEAL EDEMA CLINICAL DESCRIPTION: OD_CORNEALEDEMA: 3+

## 2023-09-13 ASSESSMENT — CORNEAL EDEMA - FOLDS/STRIAE: OD_FOLDSSTRIAE: 2+ 3+

## 2023-09-13 ASSESSMENT — AXIALLENGTH_DERIVED
OS_AL: 24.3234
OS_AL: 24.5851

## 2023-09-20 ENCOUNTER — AMBUL SURGICAL CARE (OUTPATIENT)
Dept: URBAN - METROPOLITAN AREA CLINIC 18 | Facility: CLINIC | Age: 78
Setting detail: OPHTHALMOLOGY
End: 2023-09-20
Payer: MEDICARE

## 2023-09-20 DIAGNOSIS — H18.231: ICD-10-CM

## 2023-09-20 PROCEDURE — 65755 CORNEAL TRANSPLANT: CPT | Performed by: OPHTHALMOLOGY

## 2023-09-21 ENCOUNTER — OFFICE (OUTPATIENT)
Dept: URBAN - METROPOLITAN AREA CLINIC 70 | Facility: CLINIC | Age: 78
Setting detail: OPHTHALMOLOGY
End: 2023-09-21
Payer: MEDICARE

## 2023-09-21 DIAGNOSIS — Z94.7: ICD-10-CM

## 2023-09-21 PROCEDURE — 99024 POSTOP FOLLOW-UP VISIT: CPT | Performed by: OPHTHALMOLOGY

## 2023-09-21 ASSESSMENT — REFRACTION_MANIFEST
OD_CYLINDER: -1.75
OS_SPHERE: +1.00
OD_AXIS: 036
OS_CYLINDER: -1.00
OD_SPHERE: +0.75
OS_AXIS: 097
OD_VA1: 20/100

## 2023-09-21 ASSESSMENT — SPHEQUIV_DERIVED
OS_SPHEQUIV: 0.5
OS_SPHEQUIV: 1.125
OD_SPHEQUIV: -0.125

## 2023-09-21 ASSESSMENT — CONFRONTATIONAL VISUAL FIELD TEST (CVF)
OD_FINDINGS: FULL
OS_FINDINGS: FULL

## 2023-09-21 ASSESSMENT — KERATOMETRY
METHOD_AUTO_MANUAL: AUTO
OS_K2POWER_DIOPTERS: 40.50
OS_K1POWER_DIOPTERS: 40.25
OS_AXISANGLE_DEGREES: 021
OD_K1POWER_DIOPTERS: UNABLE

## 2023-09-21 ASSESSMENT — AXIALLENGTH_DERIVED
OS_AL: 24.5851
OS_AL: 24.3234

## 2023-09-21 ASSESSMENT — VISUAL ACUITY
OD_BCVA: 20/30
OS_BCVA: 20/350

## 2023-09-21 ASSESSMENT — REFRACTION_AUTOREFRACTION
OD_SPHERE: UNABLE
OS_SPHERE: +2.00
OS_AXIS: 091
OS_CYLINDER: -1.75

## 2023-09-21 ASSESSMENT — CORNEAL EDEMA - FOLDS/STRIAE: OD_FOLDSSTRIAE: 1+

## 2023-09-21 ASSESSMENT — CORNEAL EDEMA CLINICAL DESCRIPTION: OD_CORNEALEDEMA: 1+

## 2023-09-23 ENCOUNTER — OFFICE (OUTPATIENT)
Dept: URBAN - METROPOLITAN AREA CLINIC 70 | Facility: CLINIC | Age: 78
Setting detail: OPHTHALMOLOGY
End: 2023-09-23
Payer: MEDICARE

## 2023-09-23 DIAGNOSIS — Z94.7: ICD-10-CM

## 2023-09-23 PROCEDURE — 99024 POSTOP FOLLOW-UP VISIT: CPT | Performed by: OPHTHALMOLOGY

## 2023-09-23 ASSESSMENT — REFRACTION_MANIFEST
OD_CYLINDER: -1.75
OS_CYLINDER: -1.00
OD_SPHERE: +0.75
OD_VA1: 20/100
OS_AXIS: 097
OS_SPHERE: +1.00
OD_AXIS: 036

## 2023-09-23 ASSESSMENT — KERATOMETRY
OS_K2POWER_DIOPTERS: 40.50
METHOD_AUTO_MANUAL: AUTO
OD_K1POWER_DIOPTERS: UNABLE
OS_K1POWER_DIOPTERS: 40.25
OS_AXISANGLE_DEGREES: 021

## 2023-09-23 ASSESSMENT — CONFRONTATIONAL VISUAL FIELD TEST (CVF)
OS_FINDINGS: FULL
OD_FINDINGS: FULL

## 2023-09-23 ASSESSMENT — VISUAL ACUITY
OS_BCVA: 20/400+1
OD_BCVA: 20/30

## 2023-09-23 ASSESSMENT — CORNEAL EDEMA CLINICAL DESCRIPTION: OD_CORNEALEDEMA: 1+ 2+

## 2023-09-23 ASSESSMENT — CORNEAL EDEMA - FOLDS/STRIAE: OD_FOLDSSTRIAE: 1+

## 2023-09-23 ASSESSMENT — AXIALLENGTH_DERIVED
OS_AL: 24.3234
OS_AL: 24.5851

## 2023-09-23 ASSESSMENT — SPHEQUIV_DERIVED
OD_SPHEQUIV: -0.125
OS_SPHEQUIV: 0.5
OS_SPHEQUIV: 1.125

## 2023-09-23 ASSESSMENT — REFRACTION_AUTOREFRACTION
OS_AXIS: 091
OS_CYLINDER: -1.75
OS_SPHERE: +2.00
OD_SPHERE: UNABLE

## 2023-10-03 DIAGNOSIS — K21.9 GASTRO-ESOPHAGEAL REFLUX DISEASE W/OUT ESOPHAGITIS: ICD-10-CM

## 2023-10-05 ENCOUNTER — OFFICE (OUTPATIENT)
Dept: URBAN - METROPOLITAN AREA CLINIC 70 | Facility: CLINIC | Age: 78
Setting detail: OPHTHALMOLOGY
End: 2023-10-05
Payer: MEDICARE

## 2023-10-05 DIAGNOSIS — Z94.7: ICD-10-CM

## 2023-10-05 PROCEDURE — 99024 POSTOP FOLLOW-UP VISIT: CPT | Performed by: OPHTHALMOLOGY

## 2023-10-05 RX ORDER — FAMOTIDINE 20 MG/1
20 TABLET, FILM COATED ORAL
Qty: 90 | Refills: 3 | Status: ACTIVE | COMMUNITY
Start: 2023-10-04 | End: 1900-01-01

## 2023-10-05 ASSESSMENT — SPHEQUIV_DERIVED
OD_SPHEQUIV: -0.125
OD_SPHEQUIV: 8.125
OS_SPHEQUIV: 1
OS_SPHEQUIV: 0.5

## 2023-10-05 ASSESSMENT — REFRACTION_MANIFEST
OD_AXIS: 036
OD_SPHERE: +0.75
OD_CYLINDER: -1.75
OS_CYLINDER: -1.00
OS_SPHERE: +1.00
OD_VA1: 20/100
OS_AXIS: 097

## 2023-10-05 ASSESSMENT — REFRACTION_AUTOREFRACTION
OD_SPHERE: +8.50
OD_AXIS: 146
OS_CYLINDER: -1.50
OS_SPHERE: +1.75
OD_CYLINDER: -0.75
OS_AXIS: 093

## 2023-10-05 ASSESSMENT — VISUAL ACUITY
OD_BCVA: 20/30
OS_BCVA: 20/400+1

## 2023-10-05 ASSESSMENT — KERATOMETRY
OD_K2POWER_DIOPTERS: 48.00
OD_K1POWER_DIOPTERS: 42.25
OS_AXISANGLE_DEGREES: 034
METHOD_AUTO_MANUAL: AUTO
OD_AXISANGLE_DEGREES: 107
OS_K1POWER_DIOPTERS: 40.50
OS_K2POWER_DIOPTERS: 40.75

## 2023-10-05 ASSESSMENT — CONFRONTATIONAL VISUAL FIELD TEST (CVF)
OD_FINDINGS: FULL
OS_FINDINGS: FULL

## 2023-10-05 ASSESSMENT — AXIALLENGTH_DERIVED
OS_AL: 24.4857
OD_AL: 20.35
OD_AL: 23.0562
OS_AL: 24.2777

## 2023-10-05 ASSESSMENT — CORNEAL EDEMA - FOLDS/STRIAE: OD_FOLDSSTRIAE: 1+

## 2023-10-05 ASSESSMENT — CORNEAL EDEMA CLINICAL DESCRIPTION: OD_CORNEALEDEMA: 1+ 2+

## 2023-10-11 ENCOUNTER — OFFICE (OUTPATIENT)
Dept: URBAN - METROPOLITAN AREA CLINIC 70 | Facility: CLINIC | Age: 78
Setting detail: OPHTHALMOLOGY
End: 2023-10-11
Payer: MEDICARE

## 2023-10-11 DIAGNOSIS — Z94.7: ICD-10-CM

## 2023-10-11 PROCEDURE — 99024 POSTOP FOLLOW-UP VISIT: CPT | Performed by: OPHTHALMOLOGY

## 2023-10-11 ASSESSMENT — CORNEAL EDEMA CLINICAL DESCRIPTION: OD_CORNEALEDEMA: 1+ 2+

## 2023-10-11 ASSESSMENT — VISUAL ACUITY
OS_BCVA: 20/400+1
OD_BCVA: 20/30

## 2023-10-11 ASSESSMENT — CONFRONTATIONAL VISUAL FIELD TEST (CVF)
OS_FINDINGS: FULL
OD_FINDINGS: FULL

## 2023-10-11 ASSESSMENT — REFRACTION_MANIFEST
OS_SPHERE: +1.00
OD_SPHERE: +0.75
OS_CYLINDER: -1.00
OD_AXIS: 036
OD_CYLINDER: -1.75
OD_VA1: 20/100
OS_AXIS: 097

## 2023-10-11 ASSESSMENT — CORNEAL EDEMA - FOLDS/STRIAE: OD_FOLDSSTRIAE: T

## 2023-10-11 ASSESSMENT — AXIALLENGTH_DERIVED
OS_AL: 24.4857
OS_AL: 24.2777

## 2023-10-11 ASSESSMENT — REFRACTION_AUTOREFRACTION
OS_CYLINDER: -1.50
OS_AXIS: 093
OS_SPHERE: +1.75
OD_SPHERE: UANBLE

## 2023-10-11 ASSESSMENT — KERATOMETRY
OS_K2POWER_DIOPTERS: 40.75
OS_K1POWER_DIOPTERS: 40.50
OD_K1POWER_DIOPTERS: UNABLE
OS_AXISANGLE_DEGREES: 034
METHOD_AUTO_MANUAL: AUTO

## 2023-10-11 ASSESSMENT — SPHEQUIV_DERIVED
OD_SPHEQUIV: -0.125
OS_SPHEQUIV: 0.5
OS_SPHEQUIV: 1

## 2023-10-19 ENCOUNTER — OFFICE (OUTPATIENT)
Dept: URBAN - METROPOLITAN AREA CLINIC 70 | Facility: CLINIC | Age: 78
Setting detail: OPHTHALMOLOGY
End: 2023-10-19
Payer: MEDICARE

## 2023-10-19 DIAGNOSIS — Z94.7: ICD-10-CM

## 2023-10-19 PROCEDURE — 99024 POSTOP FOLLOW-UP VISIT: CPT | Performed by: OPHTHALMOLOGY

## 2023-10-19 ASSESSMENT — REFRACTION_MANIFEST
OS_CYLINDER: -1.00
OD_AXIS: 036
OS_AXIS: 097
OD_VA1: 20/100
OD_CYLINDER: -1.75
OS_SPHERE: +1.00
OD_SPHERE: +0.75

## 2023-10-19 ASSESSMENT — REFRACTION_AUTOREFRACTION
OS_SPHERE: +1.75
OS_CYLINDER: -1.50
OD_SPHERE: +7.00
OS_AXIS: 095
OD_AXIS: 021
OD_CYLINDER: -3.50

## 2023-10-19 ASSESSMENT — CORNEAL EDEMA CLINICAL DESCRIPTION: OD_CORNEALEDEMA: 1+

## 2023-10-19 ASSESSMENT — SPHEQUIV_DERIVED
OD_SPHEQUIV: -0.125
OD_SPHEQUIV: 5.25
OS_SPHEQUIV: 0.5
OS_SPHEQUIV: 1

## 2023-10-19 ASSESSMENT — KERATOMETRY
OS_K1POWER_DIOPTERS: 40.25
METHOD_AUTO_MANUAL: AUTO
OD_K1POWER_DIOPTERS: UNABLE
OS_K2POWER_DIOPTERS: 40.75
OS_AXISANGLE_DEGREES: 019

## 2023-10-19 ASSESSMENT — CONFRONTATIONAL VISUAL FIELD TEST (CVF)
OD_FINDINGS: FULL
OS_FINDINGS: FULL

## 2023-10-19 ASSESSMENT — CORNEAL EDEMA - FOLDS/STRIAE: OD_FOLDSSTRIAE: T

## 2023-10-19 ASSESSMENT — VISUAL ACUITY
OD_BCVA: 20/30
OS_BCVA: 20/400+1

## 2023-10-19 ASSESSMENT — AXIALLENGTH_DERIVED
OS_AL: 24.5353
OS_AL: 24.3264

## 2023-11-07 ENCOUNTER — OFFICE (OUTPATIENT)
Dept: URBAN - METROPOLITAN AREA CLINIC 70 | Facility: CLINIC | Age: 78
Setting detail: OPHTHALMOLOGY
End: 2023-11-07
Payer: MEDICARE

## 2023-11-07 DIAGNOSIS — Z94.7: ICD-10-CM

## 2023-11-07 PROCEDURE — 99024 POSTOP FOLLOW-UP VISIT: CPT | Performed by: OPHTHALMOLOGY

## 2023-11-07 ASSESSMENT — REFRACTION_AUTOREFRACTION
OS_SPHERE: +2.00
OD_AXIS: 021
OS_CYLINDER: -1.75
OS_AXIS: 091
OD_SPHERE: +7.00
OD_CYLINDER: -3.50

## 2023-11-07 ASSESSMENT — SPHEQUIV_DERIVED
OD_SPHEQUIV: 5.25
OS_SPHEQUIV: 0.5
OD_SPHEQUIV: -0.125
OS_SPHEQUIV: 1.125

## 2023-11-07 ASSESSMENT — REFRACTION_MANIFEST
OS_SPHERE: +1.00
OD_SPHERE: +0.75
OS_CYLINDER: -1.00
OD_CYLINDER: -1.75
OS_AXIS: 097
OD_VA1: 20/100
OD_AXIS: 036

## 2023-11-07 ASSESSMENT — CONFRONTATIONAL VISUAL FIELD TEST (CVF)
OS_FINDINGS: FULL
OD_FINDINGS: FULL

## 2023-11-07 ASSESSMENT — CORNEAL EDEMA - FOLDS/STRIAE: OD_FOLDSSTRIAE: T

## 2023-11-07 ASSESSMENT — CORNEAL EDEMA CLINICAL DESCRIPTION: OD_CORNEALEDEMA: 1+

## 2023-11-08 ENCOUNTER — APPOINTMENT (OUTPATIENT)
Dept: GASTROENTEROLOGY | Facility: CLINIC | Age: 78
End: 2023-11-08
Payer: MEDICARE

## 2023-11-08 VITALS
HEIGHT: 72 IN | WEIGHT: 204 LBS | DIASTOLIC BLOOD PRESSURE: 84 MMHG | SYSTOLIC BLOOD PRESSURE: 122 MMHG | BODY MASS INDEX: 27.63 KG/M2

## 2023-11-08 DIAGNOSIS — Z09 ENCOUNTER FOR FOLLOW-UP EXAMINATION AFTER COMPLETED TREATMENT FOR CONDITIONS OTHER THAN MALIGNANT NEOPLASM: ICD-10-CM

## 2023-11-08 PROCEDURE — 99213 OFFICE O/P EST LOW 20 MIN: CPT

## 2023-11-22 NOTE — ASSESSMENT
Subjective   Juliann Bhatia is a 46 y.o. female  ADD    Patient presents and consents for telehealth/video visit examination.  I am at  my office patient's home address  History of Present Illness  Patient is a pleasant 46-year-old black female who comes in complaining of ADD needs refill of Adderall 30 mg twice a day meds working well no problems complaints no shortness of breath no chest pain no SI/HI concentration is good focus is good med is taking well.    The following portions of the patient's history were reviewed and updated as appropriate: allergies, current medications, past social history and problem list    Review of Systems   Constitutional:  Negative for fatigue and unexpected weight change.   Respiratory:  Negative for cough, chest tightness and shortness of breath.    Cardiovascular:  Negative for chest pain, palpitations and leg swelling.   Gastrointestinal:  Negative for nausea.   Skin:  Negative for color change and rash.   Neurological:  Negative for dizziness, syncope, weakness and headaches.       Objective     There were no vitals filed for this visit.    Physical Exam  Constitutional:       Appearance: Normal appearance. She is normal weight.   Neurological:      Mental Status: She is alert.   Psychiatric:         Mood and Affect: Mood normal.         Behavior: Behavior normal.         Thought Content: Thought content normal.         Judgment: Judgment normal.         Assessment & Plan     Diagnoses and all orders for this visit:    1. Attention deficit hyperactivity disorder (ADHD), predominantly inattentive type (Primary)    1 Adderall 30 mg 1 p.o. twice daily dispense 60 per Dr. Hernandez    As part of this patient's treatment plan, patient will be prescribed controlled substances. The patient has been made aware of appropriate use of such medications, including potential risk of somnolence, limited ability to drive and /or work safely, and potential for dependence or overdose. It has  [FreeTextEntry1] : Reviewed CT scan with patient. \par Discontinued Viagra and started patient on Cialis 20 mg 1 hour before anticipated activity.\par Medication explained to patient. Rx sent to [facility] as requested. \par Copies of CT scan results and urine cytology given to patient as requested.\par PSA due in 6 months.\par CT urogram ordered for June 2023.\par Next follow-up appointment in June after the above tests are completed. also been made clear that these medications are for use by this patient only, without concomitant use of alcohol or other substances unless prescribed.Controlled substance status of medication discussed with patient, discussed risks of medication including abuse potential and diversion potential and need to follow up for reevaluation appointment in order to receive further refills.    Part of this note may be an electronic transcription/translation of spoken language to printed text using the Dragon Dictation System.   This depression

## 2023-11-24 NOTE — ASU PREOP CHECKLIST - ORDERS/MEDICATION ADMINISTRATION RECORD ON CHART
Patient called, has a sinus infection/drainage to throat(Yellow) creating a cough not managed by any OTC meds he has tried(Nite Quil, Tylenol, decongestants)  denies sore throat, slight H/a. Denies fevers, no sob or chest pain, Covid negative. Denies n/v/d. Eating and drinking. Denies lightheadedness or dizziness. No urinary or bowel complaints. Requesting something to help. Please advise.
Spoke with patient. Per APN recommendation. Patient verbalized understanding.      Flonase 1 spray each nare BID and Mucinex (guaifenesin)
[employer] [phone] I have tried Nyquil and a few other medication but I still have a sinus drip , nose running, Coughing. I have tested negative for covid. Just need some relief.  Thanks [facility]
done

## 2023-12-05 ENCOUNTER — OFFICE (OUTPATIENT)
Dept: URBAN - METROPOLITAN AREA CLINIC 70 | Facility: CLINIC | Age: 78
Setting detail: OPHTHALMOLOGY
End: 2023-12-05
Payer: MEDICARE

## 2023-12-05 DIAGNOSIS — Z94.7: ICD-10-CM

## 2023-12-05 PROCEDURE — 99024 POSTOP FOLLOW-UP VISIT: CPT | Performed by: OPHTHALMOLOGY

## 2023-12-05 ASSESSMENT — REFRACTION_MANIFEST
OS_SPHERE: +1.00
OD_AXIS: 036
OS_SPHERE: +1.25
OD_VA1: 20/100
OD_CYLINDER: -0.50
OS_AXIS: 075
OS_AXIS: 097
OS_CYLINDER: -1.00
OD_SPHERE: +3.25
OU_VA: 20/20
OD_AXIS: 020
OD_SPHERE: +0.75
OS_CYLINDER: -1.00
OS_VA1: 20/20
OD_VA1: 20/150
OD_CYLINDER: -1.75

## 2023-12-05 ASSESSMENT — SPHEQUIV_DERIVED
OS_SPHEQUIV: 0.5
OD_SPHEQUIV: 3
OD_SPHEQUIV: -0.125
OS_SPHEQUIV: 0.75
OS_SPHEQUIV: 1
OD_SPHEQUIV: 7.375

## 2023-12-05 ASSESSMENT — REFRACTION_CURRENTRX
OS_AXIS: 073
OS_VPRISM_DIRECTION: PROGS
OD_ADD: +3.00
OD_SPHERE: +1.00
OS_ADD: +3.00
OD_OVR_VA: 20/
OS_SPHERE: PLANO
OD_VPRISM_DIRECTION: PROGS
OS_CYLINDER: -0.75
OS_OVR_VA: 20/

## 2023-12-05 ASSESSMENT — CORNEAL EDEMA - FOLDS/STRIAE: OD_FOLDSSTRIAE: ABSENT

## 2023-12-05 ASSESSMENT — REFRACTION_AUTOREFRACTION
OS_SPHERE: +1.75
OD_AXIS: 022
OD_SPHERE: +10.25
OD_CYLINDER: -5.75
OS_CYLINDER: -1.50
OS_AXIS: 092

## 2023-12-05 ASSESSMENT — CORNEAL EDEMA CLINICAL DESCRIPTION: OD_CORNEALEDEMA: ABSENT

## 2023-12-05 ASSESSMENT — CONFRONTATIONAL VISUAL FIELD TEST (CVF)
OS_FINDINGS: FULL
OD_FINDINGS: FULL

## 2023-12-12 NOTE — ED STATDOCS - BIRTH SEX
Follow up with a pcp, or go to the ER for worsening or changing condition for more workup and care  
Male

## 2024-01-10 ENCOUNTER — OFFICE (OUTPATIENT)
Dept: URBAN - METROPOLITAN AREA CLINIC 70 | Facility: CLINIC | Age: 79
Setting detail: OPHTHALMOLOGY
End: 2024-01-10
Payer: MEDICARE

## 2024-01-10 DIAGNOSIS — Z94.7: ICD-10-CM

## 2024-01-10 PROCEDURE — 99213 OFFICE O/P EST LOW 20 MIN: CPT | Performed by: OPHTHALMOLOGY

## 2024-01-10 ASSESSMENT — CONFRONTATIONAL VISUAL FIELD TEST (CVF)
OS_FINDINGS: FULL
OD_FINDINGS: FULL

## 2024-01-10 ASSESSMENT — REFRACTION_MANIFEST
OD_AXIS: 020
OD_SPHERE: +3.25
OD_CYLINDER: -1.75
OD_VA1: 20/100
OD_CYLINDER: -0.50
OD_SPHERE: +0.75
OD_VA1: 20/150
OS_CYLINDER: -1.00
OU_VA: 20/20
OS_AXIS: 097
OS_AXIS: 075
OS_SPHERE: +1.25
OS_VA1: 20/20
OD_AXIS: 036
OS_SPHERE: +1.00
OS_CYLINDER: -1.00

## 2024-01-10 ASSESSMENT — REFRACTION_AUTOREFRACTION
OS_SPHERE: +1.75
OS_CYLINDER: -1.50
OD_SPHERE: +10.75
OD_AXIS: 039
OD_CYLINDER: -3.00
OS_AXIS: 092

## 2024-01-10 ASSESSMENT — CORNEAL EDEMA CLINICAL DESCRIPTION: OD_CORNEALEDEMA: ABSENT

## 2024-01-10 ASSESSMENT — REFRACTION_CURRENTRX
OS_SPHERE: PLANO
OD_SPHERE: +1.00
OS_VPRISM_DIRECTION: PROGS
OS_OVR_VA: 20/
OD_ADD: +3.00
OS_ADD: +3.00
OD_OVR_VA: 20/
OS_CYLINDER: -0.75
OD_VPRISM_DIRECTION: PROGS
OS_AXIS: 073

## 2024-01-10 ASSESSMENT — SPHEQUIV_DERIVED
OD_SPHEQUIV: 9.25
OD_SPHEQUIV: 3
OS_SPHEQUIV: 0.75
OD_SPHEQUIV: -0.125
OS_SPHEQUIV: 0.5
OS_SPHEQUIV: 1

## 2024-01-10 ASSESSMENT — CORNEAL EDEMA - FOLDS/STRIAE: OD_FOLDSSTRIAE: ABSENT

## 2024-02-14 ENCOUNTER — OFFICE (OUTPATIENT)
Dept: URBAN - METROPOLITAN AREA CLINIC 70 | Facility: CLINIC | Age: 79
Setting detail: OPHTHALMOLOGY
End: 2024-02-14
Payer: MEDICARE

## 2024-02-14 DIAGNOSIS — Z94.7: ICD-10-CM

## 2024-02-14 PROCEDURE — 92012 INTRM OPH EXAM EST PATIENT: CPT | Performed by: OPHTHALMOLOGY

## 2024-02-14 ASSESSMENT — SPHEQUIV_DERIVED
OS_SPHEQUIV: 0.5
OS_SPHEQUIV: 1
OD_SPHEQUIV: 3
OD_SPHEQUIV: -0.125
OS_SPHEQUIV: 0.75
OS_SPHEQUIV: 0.5
OD_SPHEQUIV: 9.625

## 2024-02-14 ASSESSMENT — REFRACTION_CURRENTRX
OS_CYLINDER: -0.75
OD_AXIS: 000
OD_VPRISM_DIRECTION: PROGS
OS_VPRISM_DIRECTION: PROGS
OS_AXIS: 072
OD_ADD: +2.50
OD_OVR_VA: 20/
OS_SPHERE: +0.25
OS_ADD: +2.50
OD_CYLINDER: SPHERE
OS_OVR_VA: 20/
OD_SPHERE: +1.00

## 2024-02-14 ASSESSMENT — CONFRONTATIONAL VISUAL FIELD TEST (CVF)
OS_FINDINGS: FULL
OD_FINDINGS: FULL

## 2024-02-14 ASSESSMENT — REFRACTION_MANIFEST
OD_AXIS: 036
OS_CYLINDER: -1.00
OS_AXIS: 075
OS_VA2: 20/20(J1+)
OD_VA1: 20/150+
OS_AXIS: 097
OS_SPHERE: +1.00
OU_VA: 20/20
OS_VA1: 20/20
OD_VA1: 20/150
OD_ADD: +2.75
OD_SPHERE: +5.25
OS_AXIS: 070
OS_ADD: +2.75
OU_VA: 20/20
OD_SPHERE: +0.75
OD_VA2: 20/20(J1+)
OD_CYLINDER: -1.75
OS_CYLINDER: -1.00
OD_CYLINDER: SPHERE
OS_CYLINDER: -1.00
OS_SPHERE: +1.25
OD_CYLINDER: -0.50
OD_SPHERE: +3.25
OS_VA1: 20/20
OD_AXIS: 020
OS_SPHERE: +1.00
OD_VA1: 20/100

## 2024-02-14 ASSESSMENT — CORNEAL EDEMA - FOLDS/STRIAE: OD_FOLDSSTRIAE: ABSENT

## 2024-02-14 ASSESSMENT — REFRACTION_AUTOREFRACTION
OS_AXIS: 100
OD_SPHERE: +10.50
OD_CYLINDER: -1.75
OS_CYLINDER: -1.50
OD_AXIS: 065
OS_SPHERE: +1.75

## 2024-02-14 ASSESSMENT — CORNEAL EDEMA CLINICAL DESCRIPTION: OD_CORNEALEDEMA: ABSENT

## 2024-04-15 ENCOUNTER — RX RENEWAL (OUTPATIENT)
Age: 79
End: 2024-04-15

## 2024-04-18 ENCOUNTER — APPOINTMENT (OUTPATIENT)
Dept: GASTROENTEROLOGY | Facility: CLINIC | Age: 79
End: 2024-04-18
Payer: MEDICARE

## 2024-04-18 VITALS
WEIGHT: 210 LBS | SYSTOLIC BLOOD PRESSURE: 142 MMHG | HEIGHT: 72 IN | BODY MASS INDEX: 28.44 KG/M2 | DIASTOLIC BLOOD PRESSURE: 62 MMHG

## 2024-04-18 PROCEDURE — 99213 OFFICE O/P EST LOW 20 MIN: CPT

## 2024-04-18 NOTE — PHYSICAL EXAM

## 2024-04-18 NOTE — HISTORY OF PRESENT ILLNESS
[FreeTextEntry1] : Mr. EMORY AMEZCUA is a 78 year old male with history of epigastric discomfort.  Patient describes epigastric discomfort which is not related to meals and there is no radiation.  There is no nocturnal symptoms.  Abdominal sonogram did not show evidence of pathology and upper endoscopy showed minimal gastritis.  There is no relief with the use of empiric use of Carafate.  There is been no weight loss or bowel issues.

## 2024-04-18 NOTE — ASSESSMENT
[FreeTextEntry1] : 79 yo male with history of atypical epigastric discomfort.  Patient to try empiric trial of once daily Voquenza.  Patient given samples.  Patient to call in two weeks to assess response.  If no improvement will consider CT scan at that time.

## 2024-05-14 RX ORDER — SUCRALFATE 1 G/1
1 TABLET ORAL
Qty: 270 | Refills: 3 | Status: ACTIVE | COMMUNITY
Start: 2023-11-08 | End: 1900-01-01

## 2024-05-16 ENCOUNTER — OFFICE (OUTPATIENT)
Dept: URBAN - METROPOLITAN AREA CLINIC 70 | Facility: CLINIC | Age: 79
Setting detail: OPHTHALMOLOGY
End: 2024-05-16
Payer: MEDICARE

## 2024-05-16 DIAGNOSIS — Z94.7: ICD-10-CM

## 2024-05-16 PROCEDURE — 99213 OFFICE O/P EST LOW 20 MIN: CPT | Performed by: OPHTHALMOLOGY

## 2024-05-16 ASSESSMENT — CONFRONTATIONAL VISUAL FIELD TEST (CVF)
OD_FINDINGS: FULL
OS_FINDINGS: FULL

## 2024-05-17 LAB
PSA FREE FLD-MCNC: 10 %
PSA FREE SERPL-MCNC: 0.17 NG/ML
PSA SERPL-MCNC: 1.66 NG/ML

## 2024-05-31 PROBLEM — H52.4 PRESBYOPIA: Status: ACTIVE | Noted: 2024-05-31

## 2024-06-14 ENCOUNTER — APPOINTMENT (OUTPATIENT)
Dept: GASTROENTEROLOGY | Facility: CLINIC | Age: 79
End: 2024-06-14
Payer: MEDICARE

## 2024-06-14 VITALS
DIASTOLIC BLOOD PRESSURE: 70 MMHG | SYSTOLIC BLOOD PRESSURE: 148 MMHG | WEIGHT: 210 LBS | HEIGHT: 72 IN | BODY MASS INDEX: 28.44 KG/M2

## 2024-06-14 DIAGNOSIS — R10.13 EPIGASTRIC PAIN: ICD-10-CM

## 2024-06-14 DIAGNOSIS — K31.9 DISEASE OF STOMACH AND DUODENUM, UNSPECIFIED: ICD-10-CM

## 2024-06-14 PROCEDURE — 99213 OFFICE O/P EST LOW 20 MIN: CPT

## 2024-06-14 RX ORDER — CIPROFLOXACIN HYDROCHLORIDE 500 MG/1
500 TABLET, FILM COATED ORAL TWICE DAILY
Qty: 14 | Refills: 0 | Status: DISCONTINUED | COMMUNITY
Start: 2021-10-13 | End: 2024-06-14

## 2024-06-14 RX ORDER — OMEGA-3/DHA/EPA/FISH OIL 300-1000MG
1000 CAPSULE ORAL
Refills: 0 | Status: ACTIVE | COMMUNITY

## 2024-06-14 RX ORDER — LEVOTHYROXINE SODIUM 150 UG/1
150 TABLET ORAL
Refills: 0 | Status: ACTIVE | COMMUNITY

## 2024-06-14 RX ORDER — VONOPRAZAN FUMARATE 26.72 MG/1
20 TABLET ORAL
Qty: 90 | Refills: 3 | Status: ACTIVE | COMMUNITY
Start: 2024-06-14 | End: 1900-01-01

## 2024-06-14 RX ORDER — ACETIC ACID 20 MG/ML
2 SOLUTION AURICULAR (OTIC)
Qty: 3 | Refills: 3 | Status: DISCONTINUED | COMMUNITY
Start: 2023-07-05 | End: 2024-06-14

## 2024-06-14 RX ORDER — AMLODIPINE BESYLATE 2.5 MG/1
2.5 TABLET ORAL TWICE DAILY
Refills: 0 | Status: DISCONTINUED | COMMUNITY
Start: 2023-01-04 | End: 2024-06-14

## 2024-06-14 RX ORDER — PREDNISOLONE/GATIFLOX/NEPAFEN 1-0.5-0.1%
1 SUSPENSION, DROPS(FINAL DOSAGE FORM)(ML) OPHTHALMIC (EYE)
Refills: 0 | Status: ACTIVE | COMMUNITY

## 2024-06-14 RX ORDER — CIPROFLOXACIN AND DEXAMETHASONE 3; 1 MG/ML; MG/ML
0.3-0.1 SUSPENSION/ DROPS AURICULAR (OTIC) TWICE DAILY
Qty: 3 | Refills: 1 | Status: DISCONTINUED | COMMUNITY
Start: 2022-11-03 | End: 2024-06-14

## 2024-06-14 RX ORDER — AMLODIPINE BESYLATE AND BENAZEPRIL HYDROCHLORIDE 2.5; 1 MG/1; MG/1
2.5-1 CAPSULE ORAL
Refills: 0 | Status: ACTIVE | COMMUNITY

## 2024-06-14 RX ORDER — OFLOXACIN 3 MG/ML
0.3 SOLUTION/ DROPS OPHTHALMIC
Refills: 0 | Status: ACTIVE | COMMUNITY

## 2024-06-14 RX ORDER — FAMOTIDINE 40 MG/1
40 TABLET, FILM COATED ORAL
Qty: 90 | Refills: 3 | Status: DISCONTINUED | COMMUNITY
Start: 2023-10-03 | End: 2024-06-14

## 2024-06-14 RX ORDER — LEVOTHYROXINE SODIUM 200 UG/1
200 TABLET ORAL
Qty: 90 | Refills: 0 | Status: DISCONTINUED | COMMUNITY
Start: 2019-04-05 | End: 2024-06-14

## 2024-06-14 RX ORDER — TAMSULOSIN HYDROCHLORIDE 0.4 MG/1
0.4 CAPSULE ORAL
Qty: 90 | Refills: 3 | Status: DISCONTINUED | COMMUNITY
Start: 2021-09-15 | End: 2024-06-14

## 2024-06-14 RX ORDER — FOSINOPRIL SODIUM 20 MG/1
20 TABLET ORAL
Refills: 0 | Status: ACTIVE | COMMUNITY

## 2024-06-14 RX ORDER — HYDROCHLOROTHIAZIDE 12.5 MG/1
12.5 CAPSULE ORAL
Refills: 0 | Status: DISCONTINUED | COMMUNITY
End: 2024-06-14

## 2024-06-14 RX ORDER — LEVOFLOXACIN 500 MG/1
500 TABLET, FILM COATED ORAL
Qty: 7 | Refills: 0 | Status: DISCONTINUED | COMMUNITY
Start: 2021-11-26 | End: 2024-06-14

## 2024-06-14 RX ORDER — TADALAFIL 20 MG/1
20 TABLET ORAL
Qty: 45 | Refills: 3 | Status: DISCONTINUED | COMMUNITY
Start: 2023-01-04 | End: 2024-06-14

## 2024-06-14 NOTE — ASSESSMENT
[FreeTextEntry1] : 79 yo male with history of epigastric discomfort improving Voquenza.  Plan to continue current medication and obtain CT scan of the abdomen.  Patient also advised to discuss with his Cardiologist about discontinuing baby aspirin.

## 2024-06-14 NOTE — REASON FOR VISIT
[Consultation] : a consultation visit [Spouse] : spouse [FreeTextEntry1] : history of epigastric discomfort

## 2024-06-14 NOTE — HISTORY OF PRESENT ILLNESS
[FreeTextEntry1] : Mr. EMORY AMEZCUA is a 78 year old male with history of epigastric discomfort.  Patient continues to note epigastric discomfort although he did note a marked improvement since taking Voquenza.  There is no radiation of symptoms.  Possible etiologies discussed at length with patient. Vaginal Delivery

## 2024-06-14 NOTE — PHYSICAL EXAM

## 2024-08-28 ENCOUNTER — OFFICE (OUTPATIENT)
Dept: URBAN - METROPOLITAN AREA CLINIC 70 | Facility: CLINIC | Age: 79
Setting detail: OPHTHALMOLOGY
End: 2024-08-28
Payer: MEDICARE

## 2024-08-28 DIAGNOSIS — Z94.7: ICD-10-CM

## 2024-08-28 PROCEDURE — 92012 INTRM OPH EXAM EST PATIENT: CPT | Performed by: OPHTHALMOLOGY

## 2024-08-28 ASSESSMENT — CONFRONTATIONAL VISUAL FIELD TEST (CVF)
OS_FINDINGS: FULL
OD_FINDINGS: FULL

## 2024-09-06 RX ORDER — ACETIC ACID 20 MG/ML
2 SOLUTION AURICULAR (OTIC) 3 TIMES DAILY
Qty: 1 | Refills: 2 | Status: ACTIVE | COMMUNITY
Start: 2024-09-06 | End: 1900-01-01

## 2024-10-01 RX ORDER — OMEPRAZOLE 40 MG/1
40 CAPSULE, DELAYED RELEASE ORAL
Qty: 90 | Refills: 3 | Status: ACTIVE | COMMUNITY
Start: 2024-10-01 | End: 1900-01-01

## 2024-10-01 RX ORDER — FAMOTIDINE 40 MG/1
40 TABLET, FILM COATED ORAL
Qty: 90 | Refills: 3 | Status: ACTIVE | COMMUNITY
Start: 2024-10-01 | End: 1900-01-01

## 2024-12-05 ENCOUNTER — OFFICE (OUTPATIENT)
Dept: URBAN - METROPOLITAN AREA CLINIC 70 | Facility: CLINIC | Age: 79
Setting detail: OPHTHALMOLOGY
End: 2024-12-05
Payer: MEDICARE

## 2024-12-05 DIAGNOSIS — Z94.7: ICD-10-CM

## 2024-12-05 PROCEDURE — 92012 INTRM OPH EXAM EST PATIENT: CPT | Performed by: OPHTHALMOLOGY

## 2024-12-05 ASSESSMENT — REFRACTION_CURRENTRX
OD_ADD: +2.75
OD_CYLINDER: SPHERE
OS_SPHERE: +1.00
OS_VPRISM_DIRECTION: PROGS
OD_AXIS: 180
OD_SPHERE: +1.00
OS_OVR_VA: 20/
OS_CYLINDER: -0.75
OD_CYLINDER: 0.00
OS_AXIS: 078
OS_AXIS: 075
OS_CYLINDER: -0.75
OS_OVR_VA: 20/
OS_SPHERE: +0.25
OS_VPRISM_DIRECTION: PROGS
OS_VPRISM_DIRECTION: PROGS
OD_SPHERE: +1.00
OD_ADD: +2.50
OD_OVR_VA: 20/
OS_SPHERE: +0.25
OS_CYLINDER: -0.75
OS_AXIS: 072
OS_OVR_VA: 20/
OS_ADD: +2.50
OS_ADD: +3.25
OD_AXIS: 000
OD_SPHERE: +2.00
OS_ADD: +2.50
OD_VPRISM_DIRECTION: PROGS
OD_OVR_VA: 20/
OD_ADD: +2.50
OD_CYLINDER: SPH
OD_OVR_VA: 20/

## 2024-12-05 ASSESSMENT — REFRACTION_MANIFEST
OS_ADD: +2.50
OS_SPHERE: +1.25
OS_VA1: 20/20
OD_AXIS: 036
OD_VA1: 20/200
OD_CYLINDER: SPH
OS_AXIS: 097
OS_VA2: 20/20(J1+)
OD_VA1: 20/100
OS_SPHERE: +1.00
OD_ADD: +2.50
OD_CYLINDER: SPH
OD_ADD: +2.75
OD_CYLINDER: SPHERE
OS_VA1: 20/20
OS_CYLINDER: -1.00
OU_VA: 20/20
OS_CYLINDER: -1.00
OD_CYLINDER: -0.50
OS_ADD: +2.75
OD_CYLINDER: -1.75
OS_VA1: 20/20
OS_AXIS: 075
OD_VA1: 20/150
OD_SPHERE: +5.25
OS_AXIS: 070
OS_SPHERE: +1.00
OS_SPHERE: +1.00
OD_SPHERE: +4.75
OD_ADD: +2.50
OS_CYLINDER: -0.75
OD_VA2: 20/20(J1+)
OS_ADD: +2.50
OS_CYLINDER: -0.75
OU_VA: 20/20
OS_SPHERE: +1.00
OD_VA1: 20/150+
OD_SPHERE: +0.75
OD_SPHERE: +3.25
OS_CYLINDER: -1.00
OS_AXIS: 075
OS_AXIS: 075
OD_SPHERE: +2.00
OD_AXIS: 020
OS_VA1: 20/20

## 2024-12-05 ASSESSMENT — REFRACTION_AUTOREFRACTION
OD_AXIS: 011
OS_SPHERE: +1.75
OS_CYLINDER: -1.75
OS_AXIS: 098
OD_SPHERE: +8.00
OD_CYLINDER: -2.00

## 2024-12-05 ASSESSMENT — KERATOMETRY
OD_K1POWER_DIOPTERS: 28.75
OD_AXISANGLE_DEGREES: 072
OS_K1POWER_DIOPTERS: 40.25
OS_AXISANGLE_DEGREES: 016
OD_K2POWER_DIOPTERS: 32.00
METHOD_AUTO_MANUAL: AUTO
OS_K2POWER_DIOPTERS: 40.75

## 2024-12-05 ASSESSMENT — VISUAL ACUITY
OD_BCVA: 20/25+2
OS_BCVA: 20/80-2

## 2024-12-05 ASSESSMENT — CORNEAL EDEMA - FOLDS/STRIAE: OD_FOLDSSTRIAE: ABSENT

## 2024-12-05 ASSESSMENT — CORNEAL EDEMA CLINICAL DESCRIPTION: OD_CORNEALEDEMA: ABSENT

## 2024-12-05 ASSESSMENT — CONFRONTATIONAL VISUAL FIELD TEST (CVF)
OD_FINDINGS: FULL
OS_FINDINGS: FULL

## 2025-01-07 RX ORDER — ACETIC ACID 20 MG/ML
2 SOLUTION AURICULAR (OTIC) 3 TIMES DAILY
Qty: 1 | Refills: 2 | Status: ACTIVE | COMMUNITY
Start: 2025-01-07 | End: 1900-01-01

## 2025-03-12 ENCOUNTER — OFFICE (OUTPATIENT)
Dept: URBAN - METROPOLITAN AREA CLINIC 70 | Facility: CLINIC | Age: 80
Setting detail: OPHTHALMOLOGY
End: 2025-03-12
Payer: MEDICARE

## 2025-03-12 DIAGNOSIS — Z94.7: ICD-10-CM

## 2025-03-12 DIAGNOSIS — H18.231: ICD-10-CM

## 2025-03-12 PROCEDURE — 92012 INTRM OPH EXAM EST PATIENT: CPT | Performed by: OPHTHALMOLOGY

## 2025-03-12 PROCEDURE — 92025 CPTRIZED CORNEAL TOPOGRAPHY: CPT | Performed by: OPHTHALMOLOGY

## 2025-03-12 ASSESSMENT — CONFRONTATIONAL VISUAL FIELD TEST (CVF)
OD_FINDINGS: FULL
OS_FINDINGS: FULL

## 2025-03-12 ASSESSMENT — REFRACTION_AUTOREFRACTION
OD_AXIS: 025
OS_AXIS: 089
OS_SPHERE: +2.00
OS_CYLINDER: -2.00
OD_SPHERE: +8.00
OD_CYLINDER: -2.50

## 2025-03-12 ASSESSMENT — REFRACTION_CURRENTRX
OS_AXIS: 075
OD_CYLINDER: 0.00
OS_OVR_VA: 20/
OS_VPRISM_DIRECTION: PROGS
OS_ADD: +2.50
OS_SPHERE: +0.25
OD_OVR_VA: 20/
OS_AXIS: 078
OS_CYLINDER: -0.75
OD_ADD: +2.50
OD_AXIS: 180
OS_AXIS: 072
OS_CYLINDER: -0.75
OD_AXIS: 000
OD_SPHERE: +2.00
OD_OVR_VA: 20/
OS_OVR_VA: 20/
OS_ADD: +2.50
OD_VPRISM_DIRECTION: PROGS
OD_VPRISM_DIRECTION: PROGS
OD_ADD: +2.50
OS_VPRISM_DIRECTION: PROGS
OS_CYLINDER: -0.75
OD_CYLINDER: SPH
OD_SPHERE: +1.00
OS_SPHERE: +0.25
OD_ADD: +2.75
OS_VPRISM_DIRECTION: PROGS
OD_SPHERE: +1.00
OD_CYLINDER: SPHERE
OD_OVR_VA: 20/
OS_OVR_VA: 20/
OS_ADD: +3.25
OD_VPRISM_DIRECTION: PROGS
OS_SPHERE: +1.00

## 2025-03-12 ASSESSMENT — REFRACTION_MANIFEST
OS_AXIS: 075
OS_CYLINDER: -0.75
OS_SPHERE: +1.25
OD_VA1: 20/150+
OS_SPHERE: +1.00
OS_ADD: +2.75
OS_VA2: 20/20(J1+)
OD_CYLINDER: -1.75
OD_SPHERE: +0.75
OU_VA: 20/20
OD_CYLINDER: SPH
OD_VA1: 20/100
OD_ADD: +2.50
OS_ADD: +2.50
OS_VA1: 20/20
OD_VA1: 20/200
OS_CYLINDER: -1.00
OS_VA1: 20/20
OD_CYLINDER: -0.50
OD_AXIS: 036
OD_VA2: 20/20(J1+)
OS_CYLINDER: -1.00
OS_CYLINDER: -1.00
OS_SPHERE: +1.00
OD_SPHERE: +2.00
OD_SPHERE: +5.25
OS_VA1: 20/20
OD_VA1: 20/150
OD_SPHERE: +4.75
OS_SPHERE: +1.00
OS_AXIS: 075
OD_ADD: +2.75
OD_CYLINDER: SPHERE
OS_AXIS: 097
OD_AXIS: 020
OU_VA: 20/20
OS_CYLINDER: -0.75
OS_AXIS: 075
OD_ADD: +2.50
OS_VA1: 20/20
OD_SPHERE: +3.25
OS_AXIS: 070
OS_ADD: +2.50
OS_SPHERE: +1.00
OD_CYLINDER: SPH

## 2025-03-12 ASSESSMENT — VISUAL ACUITY
OS_BCVA: 20/100
OD_BCVA: 20/25

## 2025-03-12 ASSESSMENT — CORNEAL EDEMA CLINICAL DESCRIPTION: OD_CORNEALEDEMA: ABSENT

## 2025-03-12 ASSESSMENT — KERATOMETRY
OS_K1POWER_DIOPTERS: 40.25
OS_AXISANGLE_DEGREES: 016
OD_K2POWER_DIOPTERS: 40.50
OS_K2POWER_DIOPTERS: 40.75
OD_K1POWER_DIOPTERS: 40.00
OD_AXISANGLE_DEGREES: 016
METHOD_AUTO_MANUAL: AUTO

## 2025-03-12 ASSESSMENT — CORNEAL EDEMA - FOLDS/STRIAE: OD_FOLDSSTRIAE: ABSENT

## 2025-04-22 ENCOUNTER — APPOINTMENT (OUTPATIENT)
Dept: UROLOGY | Facility: CLINIC | Age: 80
End: 2025-04-22

## 2025-07-15 ENCOUNTER — OFFICE (OUTPATIENT)
Dept: URBAN - METROPOLITAN AREA CLINIC 70 | Facility: CLINIC | Age: 80
Setting detail: OPHTHALMOLOGY
End: 2025-07-15
Payer: MEDICARE

## 2025-07-15 DIAGNOSIS — Z94.7: ICD-10-CM

## 2025-07-15 DIAGNOSIS — H18.231: ICD-10-CM

## 2025-07-15 PROCEDURE — 92025 CPTRIZED CORNEAL TOPOGRAPHY: CPT | Performed by: OPHTHALMOLOGY

## 2025-07-15 PROCEDURE — 92012 INTRM OPH EXAM EST PATIENT: CPT | Performed by: OPHTHALMOLOGY

## 2025-07-15 ASSESSMENT — CORNEAL EDEMA CLINICAL DESCRIPTION: OD_CORNEALEDEMA: ABSENT

## 2025-07-15 ASSESSMENT — REFRACTION_MANIFEST
OD_SPHERE: +4.75
OS_SPHERE: +1.00
OS_CYLINDER: -1.00
OS_VA1: 20/20
OS_AXIS: 075
OS_VA1: 20/20
OS_CYLINDER: -0.75
OS_AXIS: 070
OD_AXIS: 036
OD_CYLINDER: SPHERE
OS_CYLINDER: -1.00
OS_AXIS: 075
OS_ADD: +2.50
OS_CYLINDER: -0.75
OD_CYLINDER: -1.75
OD_CYLINDER: SPH
OD_ADD: +2.50
OD_AXIS: 020
OD_VA1: 20/100
OS_AXIS: 075
OD_SPHERE: +5.25
OS_VA1: 20/20
OD_SPHERE: +2.00
OD_VA2: 20/20(J1+)
OS_VA1: 20/20
OD_VA1: 20/150
OD_ADD: +2.50
OD_CYLINDER: -0.50
OS_SPHERE: +1.00
OS_SPHERE: +1.25
OD_ADD: +2.75
OS_VA2: 20/20(J1+)
OU_VA: 20/20
OS_ADD: +2.75
OD_SPHERE: +3.25
OD_CYLINDER: SPH
OD_VA1: 20/150+
OS_AXIS: 097
OS_ADD: +2.50
OS_SPHERE: +1.00
OU_VA: 20/20
OD_VA1: 20/200
OS_SPHERE: +1.00
OS_CYLINDER: -1.00
OD_SPHERE: +0.75

## 2025-07-15 ASSESSMENT — CONFRONTATIONAL VISUAL FIELD TEST (CVF)
OS_FINDINGS: FULL
OD_FINDINGS: FULL

## 2025-07-15 ASSESSMENT — REFRACTION_CURRENTRX
OD_SPHERE: +2.00
OD_CYLINDER: SPHERE
OS_VPRISM_DIRECTION: PROGS
OD_OVR_VA: 20/
OD_ADD: +2.75
OD_VPRISM_DIRECTION: PROGS
OS_ADD: +3.25
OS_OVR_VA: 20/
OS_OVR_VA: 20/
OS_VPRISM_DIRECTION: PROGS
OS_AXIS: 075
OS_ADD: +2.50
OS_SPHERE: +1.00
OD_VPRISM_DIRECTION: PROGS
OS_OVR_VA: 20/
OS_CYLINDER: -0.75
OD_OVR_VA: 20/
OS_ADD: +2.50
OD_SPHERE: +1.00
OD_CYLINDER: 0.00
OD_AXIS: 180
OS_CYLINDER: -0.75
OD_SPHERE: +1.00
OS_CYLINDER: -0.75
OD_ADD: +2.50
OD_CYLINDER: SPH
OD_AXIS: 000
OD_OVR_VA: 20/
OD_VPRISM_DIRECTION: PROGS
OS_AXIS: 072
OD_ADD: +2.50
OS_SPHERE: +0.25
OS_SPHERE: +0.25
OS_AXIS: 078
OS_VPRISM_DIRECTION: PROGS

## 2025-07-15 ASSESSMENT — CORNEAL EDEMA - FOLDS/STRIAE: OD_FOLDSSTRIAE: ABSENT

## 2025-07-15 ASSESSMENT — VISUAL ACUITY
OD_BCVA: 20/20-
OS_BCVA: 20/100

## 2025-07-15 ASSESSMENT — KERATOMETRY
OS_K1POWER_DIOPTERS: 39.75
OS_AXISANGLE_DEGREES: 006
OD_AXISANGLE_DEGREES: 016
METHOD_AUTO_MANUAL: AUTO
OD_K1POWER_DIOPTERS: 40.00
OS_K2POWER_DIOPTERS: 40.50
OD_K2POWER_DIOPTERS: 40.50

## 2025-07-15 ASSESSMENT — REFRACTION_AUTOREFRACTION
OD_AXIS: 025
OS_SPHERE: +1.75
OD_CYLINDER: -2.50
OS_AXIS: 094
OS_CYLINDER: -1.50
OD_SPHERE: +8.00

## 2025-07-24 NOTE — ASU PATIENT PROFILE, ADULT - NS TRANSFER PATIENT BELONGINGS
OFFICE VISIT    Chief Complaint   Patient presents with   • Office Visit     anxiety       HISTORY OF PRESENT ILLNESS    1. Situational anxiety    2. NEVA (generalized anxiety disorder)    3. 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 Essential hypertension    5. Need for vaccination        Aureliano is a 58 year old male, patient reports increased anxiety.      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  He states that he had been doing really well with Wellbutrin and propanolol however recently due to heightened work stress propanolol has not been as helpful.  He specifically feels anxious during meetings and public gathering.    Patient is experiencing these symptoms:   Depression:  disturbed sleep and anxiety.  Anxiety:  difficulty concentrating, insomnia, irritable, palpitations, racing thoughts.   Adjustment Disorder Symptoms:  identifiable stressor within a 3 month onset of symptoms.    Panic Symptoms:  palpitations/increased heart rate, sweating and shortness of breath.      PSYCHIATRIC HISTORY  Past Psychiatric Medication Trials:  Zoloft called gynecomastia.  Paxil.        I have reviewed the patient's medications and allergies, past medical, surgical, social and family history, updating these as appropriate.  See Histories section of the electronic medical record for a display of this information.       REVIEW OF SYSTEMS  Per HPI.      PHYSICAL EXAMINATION  Vital Signs:    Vitals:    10/23/23 1310 10/23/23 1316   BP: (!) 134/90 (!) 132/90   Pulse: (!) 58    SpO2: 98%    Weight: 104.3 kg (230 lb)    Height: 6' 1\" (1.854 m)      Weight    10/23/23 1310   Weight: 104.3 kg (230 lb)     General:  Well developed, well nourished.  In no apparent distress.  Eyes:  Conjunctivae pink.  Sclerae anicteric.  HENT:  Normocephalic.   Atraumatic.  Respiratory:  Normal respiratory effort.    Cardiovascular:  Regular rate and rhythm.  Musculoskeletal:  Full range of motion in all 4 extremities proximal and distal.    Neurologic:  Gait is normal.  No motor deficits in all 4 extremities.  Integumentary:  Warm.  Dry.  Pink.   Psychiatric:  Cooperative.  Appropriate mood and affect.  Normal judgment.  Speech and behavior are appropriate.  There is no psychomotor agitation.  Dressed well and personal hygiene is appropriate.  Normal affect.  Good eye contact.      ASSESSMENT/PLAN  Situational anxiety  - ALPRAZolam (XANAX) 0.5 MG tablet; Take 0.5-1 tablets by mouth 2 times daily as needed for Anxiety. 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 Situational anxiety  2. NEVA (generalized anxiety disorder)  3. 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  -he was advised to continue Wellbutrin and propanolol as prescribed, no changes.  -we discussed adding daily medications such as escitalopram/Lexapro.  Patient would like to try something to be used as needed 1st.  Therefore alprazolam sent to his pharmacy.  Long-term risk and benefits were discussed, dependency was also discussed.  -Discussed treatment options and patient elects to start new medication today.      -PDMP reviewed; therapy appropriate, no aberrant behavior identified, prescription given.  - ALPRAZolam (XANAX) 0.5 MG tablet; Take 0.5-1 tablets by mouth 2 times daily as needed for Anxiety. Max - 2 mg/day  Dispense: 30 tablet; Refill: 0    4. Essential hypertension  -Reviewed and discussed previous reports.  -Present treatment plan reviewed with patient and  no changes made at this time.      -likely situational to current anxiety, patient was advised to continue to monitor BP at home and report if it remains elevated.  He verbalized understanding.  -DASH diet would be very helpful.  BP Readings from Last 1 Encounters:   10/23/23 1316 (!) 132/90   10/23/23 1310 (!) 134/90     5. Need for vaccination  - Influenza Quadrivalent Split Pres Free 0.5 mL Pre-filled Vacc (FluLaval, Fluzone, Fluarix; ages 6+ months - [id number]      Using shared decision making, the risks and benefits of each of the following treatment options were discussed and questions were answered to the patient's satisfaction.  Patient verbalized understanding and was agreeable to the above plan.       FOLLOW UP  Return if symptoms worsen or fail to improve.   Clothing [Consultation] : a consultation [Shortness of Breath] : shortness of breath [Spouse] : spouse [Abnormal CXR/ Chest CT] : an abnormal CXR/ chest CT

## 2025-07-28 ENCOUNTER — OFFICE (OUTPATIENT)
Dept: URBAN - METROPOLITAN AREA CLINIC 70 | Facility: CLINIC | Age: 80
Setting detail: OPHTHALMOLOGY
End: 2025-07-28
Payer: MEDICARE

## 2025-07-28 DIAGNOSIS — H52.7: ICD-10-CM

## 2025-07-28 DIAGNOSIS — H52.4: ICD-10-CM

## 2025-07-28 PROCEDURE — 92015 DETERMINE REFRACTIVE STATE: CPT | Performed by: OPTOMETRIST

## 2025-07-28 ASSESSMENT — REFRACTION_MANIFEST
OD_ADD: +3.00
OS_SPHERE: +2.50
OS_ADD: +3.00
OD_CYLINDER: -0.50
OS_AXIS: 085
OS_VA1: 20/20
OD_CYLINDER: SPHERE
OD_ADD: +2.75
OD_VA1: 20/150
OS_CYLINDER: -1.00
OS_VA1: 20/20
OS_ADD: +2.50
OD_AXIS: 020
OD_VA1: 20/200
OS_VA1: 20/20
OD_CYLINDER: SPH
OD_CYLINDER: SPH
OS_AXIS: 070
OD_SPHERE: +4.75
OS_ADD: +2.50
OD_CYLINDER: SPH
OD_CYLINDER: -1.75
OS_CYLINDER: -1.00
OS_AXIS: 075
OD_SPHERE: +2.00
OD_VA2: 20/20(J1+)
OS_SPHERE: +1.00
OS_SPHERE: +1.00
OD_VA1: 20/100
OS_CYLINDER: -0.75
OS_CYLINDER: -1.00
OS_SPHERE: +1.00
OD_VA1: 20/150+
OU_VA: 20/20
OD_AXIS: 036
OD_SPHERE: +5.25
OD_SPHERE: +3.25
OS_VA2: 20/20(J1+)
OS_AXIS: 075
OS_VA1: 20/20
OS_VA1: 20/20
OD_ADD: +2.50
OS_SPHERE: +1.00
OS_ADD: +2.75
OD_SPHERE: +2.00
OD_VA1: 20/150
OS_AXIS: 075
OD_ADD: +2.50
OS_AXIS: 097
OS_SPHERE: +1.25
OS_CYLINDER: -0.75
OS_CYLINDER: -2.50
OD_SPHERE: +0.75
OU_VA: 20/20

## 2025-07-28 ASSESSMENT — REFRACTION_CURRENTRX
OS_AXIS: 078
OS_CYLINDER: -0.75
OD_VPRISM_DIRECTION: PROGS
OD_ADD: +2.50
OS_OVR_VA: 20/
OD_SPHERE: +1.00
OS_ADD: +3.25
OD_VPRISM_DIRECTION: PROGS
OS_ADD: +2.50
OS_CYLINDER: -0.75
OS_ADD: +2.50
OD_ADD: +2.75
OD_AXIS: 000
OS_VPRISM_DIRECTION: PROGS
OD_CYLINDER: 0.00
OD_CYLINDER: SPHERE
OS_SPHERE: +0.25
OD_SPHERE: +2.00
OS_CYLINDER: -0.75
OD_AXIS: 180
OS_OVR_VA: 20/
OS_SPHERE: +1.00
OS_AXIS: 072
OS_AXIS: 075
OD_SPHERE: +1.00
OD_OVR_VA: 20/
OS_OVR_VA: 20/
OD_OVR_VA: 20/
OS_VPRISM_DIRECTION: PROGS
OS_SPHERE: +0.25
OD_CYLINDER: SPH
OD_OVR_VA: 20/
OD_ADD: +2.50
OD_VPRISM_DIRECTION: PROGS
OS_VPRISM_DIRECTION: PROGS

## 2025-07-28 ASSESSMENT — KERATOMETRY
OD_K1POWER_DIOPTERS: UNABLE
OS_K1POWER_DIOPTERS: 40.00
OS_K2POWER_DIOPTERS: 40.50
OS_AXISANGLE_DEGREES: 099
METHOD_AUTO_MANUAL: AUTO

## 2025-07-28 ASSESSMENT — VISUAL ACUITY
OS_BCVA: 20/100-1
OD_BCVA: 20/20-

## 2025-07-28 ASSESSMENT — REFRACTION_AUTOREFRACTION
OS_CYLINDER: -1.25
OS_AXIS: 090
OD_SPHERE: UNABLE
OS_SPHERE: +1.50

## 2025-09-03 ENCOUNTER — NON-APPOINTMENT (OUTPATIENT)
Age: 80
End: 2025-09-03

## 2025-09-05 ENCOUNTER — APPOINTMENT (OUTPATIENT)
Dept: DERMATOLOGY | Facility: CLINIC | Age: 80
End: 2025-09-05
Payer: MEDICARE

## 2025-09-05 ENCOUNTER — NON-APPOINTMENT (OUTPATIENT)
Age: 80
End: 2025-09-05

## 2025-09-05 VITALS — WEIGHT: 179 LBS | HEIGHT: 72 IN | BODY MASS INDEX: 24.24 KG/M2

## 2025-09-05 DIAGNOSIS — D48.5 NEOPLASM OF UNCERTAIN BEHAVIOR OF SKIN: ICD-10-CM

## 2025-09-05 DIAGNOSIS — L64.9 ANDROGENIC ALOPECIA, UNSPECIFIED: ICD-10-CM

## 2025-09-05 DIAGNOSIS — L81.4 OTHER MELANIN HYPERPIGMENTATION: ICD-10-CM

## 2025-09-05 DIAGNOSIS — L82.1 OTHER SEBORRHEIC KERATOSIS: ICD-10-CM

## 2025-09-05 PROCEDURE — 11102 TANGNTL BX SKIN SINGLE LES: CPT

## 2025-09-05 PROCEDURE — 99204 OFFICE O/P NEW MOD 45 MIN: CPT | Mod: 25

## 2025-09-05 RX ORDER — LEVOTHYROXINE SODIUM 137 UG/1
TABLET ORAL
Refills: 0 | Status: ACTIVE | COMMUNITY

## 2025-09-05 RX ORDER — MINOXIDIL 2.5 MG/1
2.5 TABLET ORAL
Qty: 180 | Refills: 3 | Status: ACTIVE | COMMUNITY
Start: 2025-09-05 | End: 1900-01-01

## 2025-09-05 RX ORDER — TRETINOIN 0.5 MG/G
0.05 CREAM TOPICAL
Qty: 1 | Refills: 3 | Status: ACTIVE | COMMUNITY
Start: 2025-09-05 | End: 1900-01-01

## 2025-09-05 RX ORDER — FOSINOPRIL SODIUM 20 MG/1
20 TABLET ORAL
Refills: 0 | Status: ACTIVE | COMMUNITY

## 2025-09-05 RX ORDER — ASCORBIC ACID 125 MG
TABLET,CHEWABLE ORAL
Refills: 0 | Status: ACTIVE | COMMUNITY

## 2025-09-05 RX ORDER — SEMAGLUTIDE 0.5 MG/.5ML
0.5 INJECTION, SOLUTION SUBCUTANEOUS
Refills: 0 | Status: ACTIVE | COMMUNITY

## 2025-09-11 LAB — CORE LAB BIOPSY: NORMAL

## 2025-09-12 ENCOUNTER — TRANSCRIPTION ENCOUNTER (OUTPATIENT)
Age: 80
End: 2025-09-12